# Patient Record
Sex: FEMALE | Race: WHITE
[De-identification: names, ages, dates, MRNs, and addresses within clinical notes are randomized per-mention and may not be internally consistent; named-entity substitution may affect disease eponyms.]

---

## 2019-03-05 ENCOUNTER — HOSPITAL ENCOUNTER (OUTPATIENT)
Dept: HOSPITAL 91 - OBT | Age: 24
LOS: 1 days | Discharge: HOME | End: 2019-03-06
Payer: MEDICAID

## 2019-03-05 ENCOUNTER — HOSPITAL ENCOUNTER (OUTPATIENT)
Dept: HOSPITAL 10 - OBT | Age: 24
LOS: 1 days | Discharge: HOME | End: 2019-03-06
Attending: OBSTETRICS & GYNECOLOGY
Payer: MEDICAID

## 2019-03-05 VITALS — WEIGHT: 194.45 LBS | BODY MASS INDEX: 32.4 KG/M2 | HEIGHT: 65 IN

## 2019-03-05 VITALS — RESPIRATION RATE: 18 BRPM | DIASTOLIC BLOOD PRESSURE: 70 MMHG | HEART RATE: 107 BPM | SYSTOLIC BLOOD PRESSURE: 119 MMHG

## 2019-03-05 DIAGNOSIS — Z3A.34: ICD-10-CM

## 2019-03-05 PROCEDURE — 76818 FETAL BIOPHYS PROFILE W/NST: CPT

## 2019-03-05 PROCEDURE — 85025 COMPLETE CBC W/AUTO DIFF WBC: CPT

## 2019-03-05 PROCEDURE — 96361 HYDRATE IV INFUSION ADD-ON: CPT

## 2019-03-05 PROCEDURE — G0463 HOSPITAL OUTPT CLINIC VISIT: HCPCS

## 2019-03-05 PROCEDURE — 81001 URINALYSIS AUTO W/SCOPE: CPT

## 2019-03-05 PROCEDURE — 96360 HYDRATION IV INFUSION INIT: CPT

## 2019-03-05 PROCEDURE — 36415 COLL VENOUS BLD VENIPUNCTURE: CPT

## 2019-03-05 PROCEDURE — 76817 TRANSVAGINAL US OBSTETRIC: CPT

## 2019-03-05 PROCEDURE — 76815 OB US LIMITED FETUS(S): CPT

## 2019-03-06 LAB
ADD MAN DIFF?: NO
ADD UMIC: YES
BASOPHIL #: 0.1 10^3/UL (ref 0–0.1)
BASOPHILS %: 0.5 % (ref 0–2)
EOSINOPHILS #: 0.3 10^3/UL (ref 0–0.5)
EOSINOPHILS %: 2.7 % (ref 0–7)
HEMATOCRIT: 32.4 % (ref 37–47)
HEMOGLOBIN: 10.5 G/DL (ref 12–16)
IMMATURE GRANS #M: 0.08 10^3/UL (ref 0–0.03)
IMMATURE GRANS % (M): 0.8 % (ref 0–0.43)
LYMPHOCYTES #: 2.5 10^3/UL (ref 0.8–2.9)
LYMPHOCYTES %: 26.1 % (ref 15–51)
MEAN CORPUSCULAR HEMOGLOBIN: 29 PG (ref 29–33)
MEAN CORPUSCULAR HGB CONC: 32.4 G/DL (ref 32–37)
MEAN CORPUSCULAR VOLUME: 89.5 FL (ref 82–101)
MEAN PLATELET VOLUME: 9.5 FL (ref 7.4–10.4)
MONOCYTE #: 1 10^3/UL (ref 0.3–0.9)
MONOCYTES %: 10.5 % (ref 0–11)
NEUTROPHIL #: 5.8 10^3/UL (ref 1.6–7.5)
NEUTROPHILS %: 59.4 % (ref 39–77)
NUCLEATED RED BLOOD CELLS #: 0 10^3/UL (ref 0–0)
NUCLEATED RED BLOOD CELLS%: 0 /100WBC (ref 0–0)
PLATELET COUNT: 233 10^3/UL (ref 140–415)
RED BLOOD COUNT: 3.62 10^6/UL (ref 4.2–5.4)
RED CELL DISTRIBUTION WIDTH: 13 % (ref 11.5–14.5)
UR ASCORBIC ACID: NEGATIVE MG/DL
UR BILIRUBIN (DIP): NEGATIVE MG/DL
UR BLOOD (DIP): NEGATIVE MG/DL
UR CLARITY: (no result)
UR COLOR: YELLOW
UR GLUCOSE (DIP): (no result) MG/DL
UR KETONES (DIP): NEGATIVE MG/DL
UR LEUKOCYTE ESTERASE (DIP): (no result) LEU/UL
UR NITRITE (DIP): NEGATIVE MG/DL
UR PH (DIP): 7 (ref 5–9)
UR RBC: 1 /HPF (ref 0–5)
UR SPECIFIC GRAVITY (DIP): 1.02 (ref 1–1.03)
UR SQUAMOUS EPITHELIAL CELL: (no result) /HPF
UR TOTAL PROTEIN (DIP): NEGATIVE MG/DL
UR UROBILINOGEN (DIP): NEGATIVE MG/DL
UR WBC: 23 /HPF (ref 0–5)
WHITE BLOOD COUNT: 9.7 10^3/UL (ref 4.8–10.8)

## 2019-03-06 RX ADMIN — PYRIDOXINE HYDROCHLORIDE 1 MLS/HR: 100 INJECTION, SOLUTION INTRAMUSCULAR; INTRAVENOUS at 01:11

## 2019-03-06 NOTE — PN
Triage Information


Date/Time





Reason for visit:  Uterine contractions


Weeks of Gestation


34+ weeks


/Para





Diabetes:  none


Hypertention:  none





Objective





Vital Signs


  Date      Temp  Pulse  Resp  B/P (MAP)   Pulse Ox  O2          O2 Flow    FiO2


Time                                                 Delivery    Rate


    3/5/19  98.2    107    18      119/70            Room Air


     23:26                           (86)





Fetal Heart Rate:  120's





Results/Medications


Result Diagram:  


3/6/19 0111





Results 24 hrs





Laboratory Tests


     Test
                                      3/5/19
22:45  3/6/19
01:11


     Urine Color                          YELLOW


     Urine Clarity
                       SLIGHTLY
CLOUDY  A  



     Urine pH                                          7.0


     Urine Specific Gravity                          1.017


     Urine Ketones                        NEGATIVE


     Urine Nitrite                        NEGATIVE


     Urine Bilirubin                      NEGATIVE


     Urine Urobilinogen                   NEGATIVE


     Urine Leukocyte Esterase                          1+  H


     Urine Microscopic RBC                               1


     Urine Microscopic WBC                             23  H


     Urine Squamous Epithelial
Cells      MODERATE  
         



     Urine Hemoglobin                     NEGATIVE


     Urine Glucose                                     1+  H


     Urine Total Protein                  NEGATIVE


     White Blood Count                                               9.7


     Red Blood Count                                               3.62  L


     Hemoglobin                                                    10.5  L


     Hematocrit                                                    32.4  L


     Mean Corpuscular Volume                                        89.5


     Mean Corpuscular Hemoglobin                                    29.0


     Mean Corpuscular Hemoglobin
Concent  
                        32.4  



     Red Cell Distribution Width                                    13.0


     Platelet Count                                                  233


     Mean Platelet Volume                                            9.5


     Immature Granulocytes %                                      0.800  H


     Neutrophils %                                                  59.4


     Lymphocytes %                                                  26.1


     Monocytes %                                                    10.5


     Eosinophils %                                                   2.7


     Basophils %                                                     0.5


     Nucleated Red Blood Cells %                                     0.0


     Immature Granulocytes #                                      0.080  H


     Neutrophils #                                                   5.8


     Lymphocytes #                                                   2.5


     Monocytes #                                                    1.0  H


     Eosinophils #                                                   0.3


     Basophils #                                                     0.1


     Nucleated Red Blood Cells #                                     0.0





Medications





Current Medications


Lactated Ringer's 1,000 ml @  125 mls/hr Q8H IV ;  Start 3/6/19 at 00:00


Imaging Results


Cervical length normal


Disposition:  Discharge


Assessment/Plan


After rest and hydration, patient states her contractions spaced out.











TOAN SALAS MD             Mar 6, 2019 04:34

## 2019-03-06 NOTE — TRIAGE
===================================

OB Triage

===================================

Datetime Report Generated by CPN: 2019 05:26

   

   

===========================

Datetime: 2019 04:32

===========================

   

 Stage of Pregnancy:  OB Triage

   

===================================

Labor Evaluation

===================================

   

 Frequency:  130

 Monitor Mode:  External

 Quality:  Mild

 Pattern:  Normal: <= 5 Contractions in 10 Minutes

 Resting Tone [place]:  Relaxed

   

===================================

Fetal Heart Rate

===================================

   

 FHR Baseline Rate:  130

 Monitor Mode:  External US

 FHR Baseline Changes:  No Baseline Change

 Variability:  Moderate 6-25 bpm

 Accelerations:  15X15

 Decelerations:  None

 Category:  Category I

   

===========================

Datetime: 2019 03:38

===========================

   

 Stage of Pregnancy:  OB Triage

   

===================================

Labor Evaluation

===================================

   

 Frequency:  5-15

 Monitor Mode:  External

 Quality:  Mild

 Pattern:  Normal: <= 5 Contractions in 10 Minutes

 Resting Tone [place]:  Relaxed

 Contraction Comments:  Pt states she feels ucs q30 min

   

===================================

Fetal Heart Rate

===================================

   

 FHR Baseline Rate:  125

 Monitor Mode:  External US

 FHR Baseline Changes:  No Baseline Change

 Variability:  Moderate 6-25 bpm

 Accelerations:  15X15

 Decelerations:  None

 Category:  Category I

   

===========================

Datetime: 2019 02:50

===========================

   

 Stage of Pregnancy:  OB Triage

   

===================================

Labor Evaluation

===================================

   

 Frequency:  2-6

 Monitor Mode:  External

 Duration (sec)2399:  20-40

 Quality:  Mild

 Pattern:  Normal: <= 5 Contractions in 10 Minutes

 Resting Tone [place]:  Relaxed

   

===================================

Fetal Heart Rate

===================================

   

 FHR Baseline Rate:  130

 Monitor Mode:  External US

 Variability:  Moderate 6-25 bpm

 Accelerations:  15X15

 Decelerations:  None

 Category:  Category I

   

===========================

Datetime: 2019 02:13

===========================

   

   

===================================

Vaginal Exam

===================================

   

 Membrane Status:  Intact

   

===========================

Datetime: 2019 02:02

===========================

   

 Stage of Pregnancy:  OB Triage

   

===================================

Labor Evaluation

===================================

   

 Frequency:  2-15

 Monitor Mode:  External

 Duration (sec)2399:  10-50

 Quality:  Mild

 Pattern:  Normal: <= 5 Contractions in 10 Minutes

 Resting Tone [place]:  Relaxed

 Contraction Comments:  Pt states ucs very mild and occas

   

===================================

Fetal Heart Rate

===================================

   

 FHR Baseline Rate:  140

 Monitor Mode:  External US

 FHR Baseline Changes:  No Baseline Change

 Variability:  Moderate 6-25 bpm

 Accelerations:  15X15

 Decelerations:  None

 Category:  Category I

   

===================================

Pain Assessment

===================================

   

 Pain Scale:  2

 Pain Presence:  Intermittent

 Pain Type:  Cramping

 Pain Location:  Abdomen

   

===========================

Datetime: 2019 01:11

===========================

   

 Stage of Pregnancy:  OB Triage

   

===================================

Labor Evaluation

===================================

   

 Frequency:  3-6

 Monitor Mode:  External

 Quality:  Mild

 Pattern:  Normal: <= 5 Contractions in 10 Minutes

 Resting Tone [place]:  Relaxed

   

===================================

Fetal Heart Rate

===================================

   

 FHR Baseline Rate:  135

 Monitor Mode:  External US

 FHR Baseline Changes:  No Baseline Change

 Variability:  Moderate 6-25 bpm

 Accelerations:  15X15

 Decelerations:  None

 Category:  Category I

   

===========================

Datetime: 2019 01:07

===========================

   

 Stage of Pregnancy:  OB Triage

   

===================================

Pain Assessment

===================================

   

 Pain Scale:  4

 Pain Presence:  Intermittent

 Pain Type:  Cramping

 Pain Location:  Abdomen

   

===========================

Datetime: 2019 00:30

===========================

   

 Stage of Pregnancy:  OB Triage

   

===================================

Fetal Heart Rate

===================================

   

 FHR Baseline Rate:  140

 Monitor Mode:  External US

 FHR Baseline Changes:  No Baseline Change

 Variability:  Moderate 6-25 bpm

 Accelerations:  15X15

 Decelerations:  None

 Category:  Category I

   

===========================

Datetime: 2019 23:58

===========================

   

 Stage of Pregnancy:  OB Triage

 Monitor Mode:  External

 Quality:  Mild

 Pattern:  Normal: <= 5 Contractions in 10 Minutes

 Resting Tone [place]:  Relaxed

   

===================================

Fetal Heart Rate

===================================

   

 FHR Baseline Rate:  130

 Monitor Mode:  External US

 FHR Baseline Changes:  No Baseline Change

 Variability:  Moderate 6-25 bpm

 Accelerations:  15X15

 Decelerations:  None

 Category:  Category I

   

===========================

Datetime: 2019 23:51

===========================

   

 Time of Arrival:  2019 22:45

 EGA:  34.3

 Arrived By:  Ambulatory

 Arrived From:  Home

 Chief Complaint:   hx c/s x2 and ptd on olamide weekly c/o ucs and back pain

 Fetal Movement:  Present

 Contractions:  Irregular

 Time Contractions Began:  2019 09:00

 Contractions:  q5-15

 Rupture of Membranes:  Denies

 Vaginal Bleeding:  None

 Vaginal Discharge:  Denies

 Recent Sexual Intercouse:  Denies

 Abdominal Trauma:  Not Applicable

 Patient Complaints:  Contractions; Back Pain

 Time Provider Notified:  2019 23:30

 Provider Notified:  Dr Srinivasan

 Initial Plan:  EFM,UA,CVL,EFW,BPP

   

===========================

Datetime: 2019 23:30

===========================

   

 Stage of Pregnancy:  OB Triage

   

===================================

Labor Evaluation

===================================

   

 Frequency:  2-5

 Monitor Mode:  External

 Quality:  Mild

 Pattern:  Normal: <= 5 Contractions in 10 Minutes

 Resting Tone [place]:  Relaxed

   

===================================

Fetal Heart Rate

===================================

   

 FHR Baseline Rate:  140

 Monitor Mode:  External US

 FHR Baseline Changes:  No Baseline Change

 Variability:  Moderate 6-25 bpm

 Accelerations:  15X15

 Decelerations:  None

 Category:  Category I

   

===========================

Datetime: 2019 23:00

===========================

   

 Stage of Pregnancy:  OB Triage

   

===================================

Maternal Assessment

===================================

   

 Level of Consciousness:  Fully Conscious

 Headache:  Denies

 Blurred Vision:  No

 Nausea/Vomiting:  Denies

 RUQ Epigastric Pain:  Denies

 Facial Edema:  None

 Monitor Mode:  External

 Resting Tone [place]:  Relaxed

   

===================================

Fetal Heart Rate

===================================

   

 FHR Baseline Rate:  140

 Monitor Mode:  External US

   

===================================

Pain Assessment

===================================

   

 Pain Scale:  8

 Pain Presence:  Intermittent

 Pain Type:  Cramping; Pressure; Ache

 Pain Location:  Abdomen; Back

## 2019-03-07 ENCOUNTER — HOSPITAL ENCOUNTER (OUTPATIENT)
Dept: HOSPITAL 91 - OBT | Age: 24
Discharge: HOME | End: 2019-03-07
Payer: MEDICAID

## 2019-03-07 ENCOUNTER — HOSPITAL ENCOUNTER (OUTPATIENT)
Dept: HOSPITAL 10 - OBT | Age: 24
Discharge: HOME | End: 2019-03-07
Attending: OBSTETRICS & GYNECOLOGY
Payer: MEDICAID

## 2019-03-07 VITALS
HEIGHT: 67 IN | BODY MASS INDEX: 30 KG/M2 | WEIGHT: 191.14 LBS | BODY MASS INDEX: 30 KG/M2 | WEIGHT: 191.14 LBS | HEIGHT: 67 IN

## 2019-03-07 VITALS — SYSTOLIC BLOOD PRESSURE: 125 MMHG | DIASTOLIC BLOOD PRESSURE: 71 MMHG | RESPIRATION RATE: 18 BRPM | HEART RATE: 109 BPM

## 2019-03-07 DIAGNOSIS — Z3A.34: ICD-10-CM

## 2019-03-07 LAB
ADD UMIC: YES
UR ASCORBIC ACID: NEGATIVE MG/DL
UR BACTERIA: (no result) /HPF
UR BILIRUBIN (DIP): NEGATIVE MG/DL
UR BLOOD (DIP): NEGATIVE MG/DL
UR CLARITY: (no result)
UR COLOR: (no result)
UR GLUCOSE (DIP): NEGATIVE MG/DL
UR KETONES (DIP): NEGATIVE MG/DL
UR LEUKOCYTE ESTERASE (DIP): (no result) LEU/UL
UR NITRITE (DIP): NEGATIVE MG/DL
UR PH (DIP): 6 (ref 5–9)
UR RBC: 4 /HPF (ref 0–5)
UR SPECIFIC GRAVITY (DIP): 1 (ref 1–1.03)
UR SQUAMOUS EPITHELIAL CELL: (no result) /HPF
UR TOTAL PROTEIN (DIP): NEGATIVE MG/DL
UR UROBILINOGEN (DIP): NEGATIVE MG/DL
UR WBC: 4 /HPF (ref 0–5)

## 2019-03-07 PROCEDURE — 36415 COLL VENOUS BLD VENIPUNCTURE: CPT

## 2019-03-07 PROCEDURE — 87086 URINE CULTURE/COLONY COUNT: CPT

## 2019-03-07 PROCEDURE — 96360 HYDRATION IV INFUSION INIT: CPT

## 2019-03-07 PROCEDURE — 96361 HYDRATE IV INFUSION ADD-ON: CPT

## 2019-03-07 PROCEDURE — 76817 TRANSVAGINAL US OBSTETRIC: CPT

## 2019-03-07 PROCEDURE — 96372 THER/PROPH/DIAG INJ SC/IM: CPT

## 2019-03-07 PROCEDURE — 76818 FETAL BIOPHYS PROFILE W/NST: CPT

## 2019-03-07 PROCEDURE — 81001 URINALYSIS AUTO W/SCOPE: CPT

## 2019-03-07 PROCEDURE — G0463 HOSPITAL OUTPT CLINIC VISIT: HCPCS

## 2019-03-07 RX ADMIN — PYRIDOXINE HYDROCHLORIDE 1 MLS/HR: 100 INJECTION, SOLUTION INTRAMUSCULAR; INTRAVENOUS at 11:43

## 2019-03-07 RX ADMIN — TERBUTALINE SULFATE 1 MG: 1 INJECTION SUBCUTANEOUS at 11:43

## 2019-03-07 NOTE — TRIAGE
===================================

OB Triage

===================================

Datetime Report Generated by CPN: 03/07/2019 13:42

   

   

===========================

Datetime: 03/07/2019 12:58

===========================

   

 Stage of Pregnancy:  OB Triage

   

===========================

Datetime: 03/07/2019 12:40

===========================

   

 Frequency:  0

 Monitor Mode:  External

 Pattern:  Normal: <= 5 Contractions in 10 Minutes

 Resting Tone [place]:  Relaxed

 FHR Baseline Rate:  135

 Monitor Mode:  External US

 Variability:  Moderate 6-25 bpm

 Accelerations:  10X10

 Decelerations:  None

 Category:  Category I

 Pain Scale:  0

 Pain Presence:  None/Denies

 Pain Type:  N/A

 Pain Goal:  3

 Pain Relief Measures:  Comfort Measures

   

===========================

Datetime: 03/07/2019 11:43

===========================

   

 Frequency:  4-6

 Monitor Mode:  External

 Duration (sec)2399:  30-40

 Quality:  Mild

 Pattern:  Normal: <= 5 Contractions in 10 Minutes

 Resting Tone [place]:  Relaxed

 FHR Baseline Rate:  135

 Monitor Mode:  External US

 Variability:  Moderate 6-25 bpm

 Accelerations:  10X10

 Decelerations:  None

 Category:  Category I

 Pain Scale:  7

 Pain Presence:  Intermittent

 Pain Type:  Cramping

 Pain Location:  Abdomen; Back; Perineum

 Pain Goal:  3

 Pain Relief Measures:  Comfort Measures

   

===========================

Datetime: 03/07/2019 11:00

===========================

   

 Stage of Pregnancy:  OB Triage

   

===========================

Datetime: 03/07/2019 10:45

===========================

   

 Stage of Pregnancy:  OB Triage

 Assessment Type:  Triage

 Level of Consciousness:  Fully Conscious

 DTR's/Clonus:  DTRs 2+; No Clonus

 Headache:  Denies

 Blurred Vision:  No

 Respiratory Effort:  Unlabored; Regular Rhythm; Equal Expansion

 Breath Sounds, Left:  Clear and Equal

 Breath Sounds, Right:  Clear and Equal

 Nausea/Vomiting:  Denies

 RUQ Epigastric Pain:  Denies

 Facial Edema:  None

 Temperature Route:  Axillary

 History of Falling:  (0) No

 Secondary Diagnosis:  (0) No

 Ambulatory Aid:  (0) Bedrest/Nurse Assist

 IV Therapy:  (0) No

 Gait:  (0) Normal/Bedrest/Immobile

 Mental Status:  (0) Oriented to Own Ability

 Fall Score:  0

 Fall Risk Score Definition:  No Risk: No action required

 Frequency:  5-6

 Monitor Mode:  External

 Duration (sec)2399:  30-50

 Quality:  Mild

 Pattern:  Normal: <= 5 Contractions in 10 Minutes

 Resting Tone [place]:  Relaxed

 FHR Baseline Rate:  135

 Monitor Mode:  External US

 Variability:  Moderate 6-25 bpm

 Accelerations:  None

 Decelerations:  None

 Category:  Category I

 Pain Scale:  8

 Pain Presence:  Intermittent

 Pain Type:  Cramping

 Pain Location:  Abdomen; Back

 Pain Goal:  3

 Pain Relief Measures:  Comfort Measures

   

===========================

Datetime: 03/07/2019 10:44

===========================

   

 Time of Arrival:  03/07/2019 10:10

 EGA:  34.5

 Arrived By:  Ambulatory

 Arrived From:  Dr. Office

 Chief Complaint:  REFERRED FROM CLINIC THIS AM TO R/O PTL, 

   DENIES BLEEDING OR LEAKING OF FLUID, SOME INCONSISTANT ABDOMINAL PAIN

 Fetal Movement:  Present

 Contractions:  Occasional

 Rupture of Membranes:  Denies

 Vaginal Bleeding:  None

 Vaginal Discharge:  Denies

 Recent Sexual Intercouse:  Denies

 Abdominal Trauma:  Not Applicable

 Patient Complaints:  Cramping

 Time Provider Notified:  03/07/2019 11:00

 Provider Notified:  SHOAIB

 Initial Plan:  MONITOR, U/A, BPP, CL, IV HYDRATION, TERB

## 2019-03-07 NOTE — PN
Triage Information


Date/Time


2019


Reason for visit:  


Weeks of Gestation


34 weeks and 5 days


/Para


 3 para 2


Diabetes:  none


Hypertention:  none


Additional information


23-year-old  with IUP at 34 weeks and 5 days times presented with complaint


of uterine contractions.  She denied any leaking of fluid, vaginal bleeding or 


decreased fetal movement.  She was seen 2 days ago with the similar symptoms and


had been ruled out for  labor.


Patient received IV hydration and a dose of terbutaline ordered by primary OB 


attending.  Symptoms resolved.





Objective





Vital Signs


  Date      Temp  Pulse  Resp  B/P (MAP)   Pulse Ox  O2          O2 Flow    FiO2


Time                                                 Delivery    Rate


    3/7/19  98.2    109    18      125/71


     10:59                           (89)





Fetal Heart Rate:  130's


Fetal Heart Rate Comments


Category 1


Exam


General appearance: Alert and oriented x4 does not appear to be in any acute 


distress


Abdomen: Soft, gravid, fundal height consider gestational age


NST: Category 1


Initial location contraction noted on the monitor and resolved after hydration 


and a dose of terbutaline


UA negative BPP: 8/8





Cervical length: 4.1


EMMANUEL: 18.2


Laboratory Tests


              Test
                                  3/7/19
10:45


              Urine Color                      STRAW


              Urine Clarity
                   SLIGHTLY
CLOUDY  A


              Urine pH                                      6.0


              Urine Specific Gravity                      1.005


              Urine Ketones                    NEGATIVE


              Urine Nitrite                    NEGATIVE


              Urine Bilirubin                  NEGATIVE


              Urine Urobilinogen               NEGATIVE


              Urine Leukocyte Esterase         TRACE  A


              Urine Microscopic RBC                           4


              Urine Microscopic WBC                           4


              Urine Squamous Epithelial
Cells  FEW  



              Urine Bacteria                   FEW  A


              Urine Hemoglobin                 NEGATIVE


              Urine Glucose                    NEGATIVE


              Urine Total Protein              NEGATIVE








Results/Medications


Results 24 hrs





Laboratory Tests


              Test
                                  3/7/19
10:45


              Urine Color                      STRAW


              Urine Clarity
                   SLIGHTLY
CLOUDY  A


              Urine pH                                      6.0


              Urine Specific Gravity                      1.005


              Urine Ketones                    NEGATIVE


              Urine Nitrite                    NEGATIVE


              Urine Bilirubin                  NEGATIVE


              Urine Urobilinogen               NEGATIVE


              Urine Leukocyte Esterase         TRACE  A


              Urine Microscopic RBC                           4


              Urine Microscopic WBC                           4


              Urine Squamous Epithelial
Cells  FEW  



              Urine Bacteria                   FEW  A


              Urine Hemoglobin                 NEGATIVE


              Urine Glucose                    NEGATIVE


              Urine Total Protein              NEGATIVE





Imaging Results


PROCEDURE:   US OB biophysical profile. Ultrasound cervix


 


CLINICAL INDICATION:    decreased fetal movements,  labor 


 


TECHNIQUE:   Multiple sonographic images of the pelvis were obtained.  In 


addition, transvaginal images of the cervix were obtained. The images were 


reviewed on a PACS workstation. 


 


COMPARISON:   No prior studies are available for comparison. 


 


FINDINGS:


 


The cervix measures 4.1 cm in length.


There is a single live intrauterine gestation.  Cardiac activity is present with


140 beats per minute. 


There is a vertex presentation. 


The placenta is fundal.   There is no evidence of placental abruption.


There is a normal amount of amniotic fluid with an EMMANUEL = 18.2 cm.


 


Biophysical profile:


Fetal movement 2/2


Fetal tone 2/2.


Fetal breathing 2/2 


EMMANUEL 2/2


 


Total  


 


RPTAT: AA . 


 


IMPRESSION:


Normal biophysical profile.  


Cervix measures 4.1 cm in length.


Disposition:  Discharge


Assessment/Plan


IUP at 34 weeks and 5 days


History of  x2


 contraction, resolved after IV hydration and a dose of terbutaline 


ordered by primary OB attending


Symptoms resolved


No evidence of  labor


Patient doing well.


Denies any symptoms.





Patient will be discharged home in stable condition


Strict  labor precaution and fetal kick count and follow-up within 24 


hours with primary OB attending discussed with patient


Advised the patient to have rest at home with adequate p.o. hydration avoid of 


sexual intercourse and return to triage if she has any other episodes of lower 


abdominal pain or contractions, decreased fetal movements vaginal bleeding or 


any other concerns.


Patient verbalized understanding.  Malay interpretation used.


All questions were answered to patient's best satisfaction.











TERRI PAIGE MD               Mar 7, 2019 15:18

## 2019-03-10 ENCOUNTER — HOSPITAL ENCOUNTER (INPATIENT)
Dept: HOSPITAL 10 - OBT | Age: 24
LOS: 3 days | Discharge: HOME | DRG: 833 | End: 2019-03-13
Attending: OBSTETRICS & GYNECOLOGY | Admitting: OBSTETRICS & GYNECOLOGY
Payer: MEDICAID

## 2019-03-10 ENCOUNTER — HOSPITAL ENCOUNTER (INPATIENT)
Dept: HOSPITAL 91 - OBT | Age: 24
LOS: 3 days | Discharge: HOME | DRG: 833 | End: 2019-03-13
Payer: MEDICAID

## 2019-03-10 VITALS
WEIGHT: 188.72 LBS | BODY MASS INDEX: 31.44 KG/M2 | WEIGHT: 188.72 LBS | HEIGHT: 65 IN | HEIGHT: 65 IN | BODY MASS INDEX: 31.44 KG/M2

## 2019-03-10 DIAGNOSIS — Z3A.35: ICD-10-CM

## 2019-03-10 DIAGNOSIS — O34.219: ICD-10-CM

## 2019-03-10 DIAGNOSIS — O36.8130: Primary | ICD-10-CM

## 2019-03-10 LAB
ADD MAN DIFF?: NO
ADD UMIC: NO
BASOPHIL #: 0 10^3/UL (ref 0–0.1)
BASOPHILS %: 0.3 % (ref 0–2)
EOSINOPHILS #: 0.2 10^3/UL (ref 0–0.5)
EOSINOPHILS %: 2.6 % (ref 0–7)
HEMATOCRIT: 33 % (ref 37–47)
HEMOGLOBIN: 10.6 G/DL (ref 12–16)
IMMATURE GRANS #M: 0.08 10^3/UL (ref 0–0.03)
IMMATURE GRANS % (M): 1 % (ref 0–0.43)
LYMPHOCYTES #: 1.8 10^3/UL (ref 0.8–2.9)
LYMPHOCYTES %: 22.5 % (ref 15–51)
MEAN CORPUSCULAR HEMOGLOBIN: 28.6 PG (ref 29–33)
MEAN CORPUSCULAR HGB CONC: 32.1 G/DL (ref 32–37)
MEAN CORPUSCULAR VOLUME: 88.9 FL (ref 82–101)
MEAN PLATELET VOLUME: 9.1 FL (ref 7.4–10.4)
MONOCYTE #: 0.6 10^3/UL (ref 0.3–0.9)
MONOCYTES %: 7.2 % (ref 0–11)
NEUTROPHIL #: 5.3 10^3/UL (ref 1.6–7.5)
NEUTROPHILS %: 66.4 % (ref 39–77)
NUCLEATED RED BLOOD CELLS #: 0 10^3/UL (ref 0–0)
NUCLEATED RED BLOOD CELLS%: 0 /100WBC (ref 0–0)
PLATELET COUNT: 225 10^3/UL (ref 140–415)
RAPID PLASMA REAGIN: NONREACTIVE
RED BLOOD COUNT: 3.71 10^6/UL (ref 4.2–5.4)
RED CELL DISTRIBUTION WIDTH: 13 % (ref 11.5–14.5)
UR ASCORBIC ACID: NEGATIVE MG/DL
UR BILIRUBIN (DIP): NEGATIVE MG/DL
UR BLOOD (DIP): NEGATIVE MG/DL
UR CLARITY: (no result)
UR COLOR: YELLOW
UR GLUCOSE (DIP): NEGATIVE MG/DL
UR KETONES (DIP): NEGATIVE MG/DL
UR LEUKOCYTE ESTERASE (DIP): NEGATIVE LEU/UL
UR NITRITE (DIP): NEGATIVE MG/DL
UR PH (DIP): 6 (ref 5–9)
UR RBC: 0 /HPF (ref 0–5)
UR SPECIFIC GRAVITY (DIP): 1.02 (ref 1–1.03)
UR SQUAMOUS EPITHELIAL CELL: (no result) /HPF
UR TOTAL PROTEIN (DIP): NEGATIVE MG/DL
UR UROBILINOGEN (DIP): NEGATIVE MG/DL
UR WBC: 1 /HPF (ref 0–5)
WHITE BLOOD COUNT: 8 10^3/UL (ref 4.8–10.8)

## 2019-03-10 PROCEDURE — 86900 BLOOD TYPING SEROLOGIC ABO: CPT

## 2019-03-10 PROCEDURE — 85025 COMPLETE CBC W/AUTO DIFF WBC: CPT

## 2019-03-10 PROCEDURE — 87086 URINE CULTURE/COLONY COUNT: CPT

## 2019-03-10 PROCEDURE — 4A1HXCZ MONITORING OF PRODUCTS OF CONCEPTION, CARDIAC RATE, EXTERNAL APPROACH: ICD-10-PCS | Performed by: OBSTETRICS & GYNECOLOGY

## 2019-03-10 PROCEDURE — 81001 URINALYSIS AUTO W/SCOPE: CPT

## 2019-03-10 PROCEDURE — 81003 URINALYSIS AUTO W/O SCOPE: CPT

## 2019-03-10 PROCEDURE — 76818 FETAL BIOPHYS PROFILE W/NST: CPT

## 2019-03-10 PROCEDURE — 86592 SYPHILIS TEST NON-TREP QUAL: CPT

## 2019-03-10 PROCEDURE — 86901 BLOOD TYPING SEROLOGIC RH(D): CPT

## 2019-03-10 PROCEDURE — 4A1HXCZ MONITORING OF PRODUCTS OF CONCEPTION, CARDIAC RATE, EXTERNAL APPROACH: ICD-10-PCS

## 2019-03-10 PROCEDURE — 80307 DRUG TEST PRSMV CHEM ANLYZR: CPT

## 2019-03-10 PROCEDURE — G0463 HOSPITAL OUTPT CLINIC VISIT: HCPCS

## 2019-03-10 RX ADMIN — PYRIDOXINE HYDROCHLORIDE 1 MLS/HR: 100 INJECTION, SOLUTION INTRAMUSCULAR; INTRAVENOUS at 13:16

## 2019-03-10 RX ADMIN — PYRIDOXINE HYDROCHLORIDE SCH MLS/HR: 100 INJECTION, SOLUTION INTRAMUSCULAR; INTRAVENOUS at 20:08

## 2019-03-10 RX ADMIN — DEXAMETHASONE SODIUM PHOSPHATE SCH MG: 4 INJECTION, SOLUTION INTRAMUSCULAR; INTRAVENOUS at 20:28

## 2019-03-10 RX ADMIN — PYRIDOXINE HYDROCHLORIDE SCH MLS/HR: 100 INJECTION, SOLUTION INTRAMUSCULAR; INTRAVENOUS at 13:16

## 2019-03-10 RX ADMIN — PYRIDOXINE HYDROCHLORIDE 1 MLS/HR: 100 INJECTION, SOLUTION INTRAMUSCULAR; INTRAVENOUS at 20:08

## 2019-03-10 RX ADMIN — DEXAMETHASONE SODIUM PHOSPHATE 1 MG: 4 INJECTION, SOLUTION INTRAMUSCULAR; INTRAVENOUS at 20:28

## 2019-03-10 RX ADMIN — TERBUTALINE SULFATE 1 MG: 1 INJECTION SUBCUTANEOUS at 19:39

## 2019-03-10 NOTE — TRIAGE
===================================

OB Triage

===================================

Datetime Report Generated by CPN: 03/10/2019 11:18

   

   

===========================

Datetime: 03/10/2019 11:18

===========================

   

 Assessment Type:  Admission Assessment

   

===================================

Maternal Assessment

===================================

   

 Level of Consciousness:  Fully Conscious

 DTR's/Clonus:  DTRs 2+; No Clonus

 Headache:  Denies

 Blurred Vision:  No

 Respiratory Effort:  Unlabored; Regular Rhythm; Equal Expansion

 Breath Sounds, Left:  Clear and Equal

 Breath Sounds, Right:  Clear and Equal

 Nausea/Vomiting:  Denies

 RUQ Epigastric Pain:  Denies

 Facial Edema:  None

   

===================================

Fall Risk Assessment

===================================

   

 History of Falling:  (0) No

 Secondary Diagnosis:  (0) No

 Ambulatory Aid:  (0) Bedrest/Nurse Assist

 IV Therapy:  (0) No

 Gait:  (0) Normal/Bedrest/Immobile

 Mental Status:  (0) Oriented to Own Ability

 Fall Score:  0

 Fall Risk Score Definition:  No Risk: No action required

   

===========================

Datetime: 03/10/2019 11:00

===========================

   

 Stage of Pregnancy:  OB Triage

   

===================================

Maternal Assessment

===================================

   

 Level of Consciousness:  Fully Conscious

 DTR's/Clonus:  DTRs 1+

 Headache:  Denies

 Breath Sounds, Left:  Clear and Equal

 Breath Sounds, Right:  Clear and Equal

 Nausea/Vomiting:  Denies

 RUQ Epigastric Pain:  Denies

   

===================================

Labor Evaluation

===================================

   

 Frequency:  NONE

 Monitor Mode:  External

 Resting Tone Rudd:  Relaxed

   

===================================

Fetal Heart Rate

===================================

   

 FHR Baseline Rate:  145

 Monitor Mode:  External US

 Variability:  Moderate 6-25 bpm

 Accelerations:  15X15

 Decelerations:  Variable

 Category:  Category I

   

===================================

Pain Assessment

===================================

   

 Pain Scale:  3

 Pain Presence:  Constant

 Pain Type:  Ache

 Pain Location:  Abdomen

 Pain Goal:  3

   

===================================

Vaginal Exam

===================================

   

 Membrane Status:  Intact

   

===========================

Datetime: 03/10/2019 10:46

===========================

   

 Comments:  vARIABLE NOTED BASELINE 145 BPM, DOWN TO ABOUT 100 FOR ABOUT 60 SEC AND GOING BACK TO BAS
MAGGY. PT REPOSITIONED

   

===========================

Datetime: 03/10/2019 10:30

===========================

   

   

===================================

Maternal Assessment

===================================

   

 Level of Consciousness:  Fully Conscious

 DTR's/Clonus:  DTRs 1+

 Headache:  Denies

 Blurred Vision:  No

 Respiratory Effort:  Unlabored

 Breath Sounds, Left:  Clear and Equal

 Breath Sounds, Right:  Clear and Equal

 Nausea/Vomiting:  Denies

 RUQ Epigastric Pain:  Denies

 Facial Edema:  None

   

===================================

Labor Evaluation

===================================

   

 Frequency:  NONE

 Monitor Mode:  External

 Resting Tone Rudd:  Relaxed

   

===================================

Fetal Heart Rate

===================================

   

 FHR Baseline Rate:  145

 Monitor Mode:  External US

 Variability:  Moderate 6-25 bpm

 Accelerations:  15X15

 Decelerations:  Variable

 Category:  Category I

   

===================================

Pain Assessment

===================================

   

 Pain Scale:  3

 Pain Presence:  Constant

 Pain Type:  Ache

 Pain Location:  Abdomen

 Pain Goal:  3

 Pain Assessment Comments:  INCISIONAL PAIN

   

===================================

Vaginal Exam

===================================

   

 Membrane Status:  Intact

   

===========================

Datetime: 03/10/2019 09:58

===========================

   

 Assessment Type:  Triage

   

===================================

Maternal Assessment

===================================

   

 Level of Consciousness:  Fully Conscious

 DTR's/Clonus:  DTRs 2+; No Clonus

 Headache:  Denies

 Blurred Vision:  No

 Respiratory Effort:  Unlabored; Regular Rhythm; Equal Expansion

 Breath Sounds, Left:  Clear and Equal

 Breath Sounds, Right:  Clear and Equal

 Nausea/Vomiting:  Denies

 RUQ Epigastric Pain:  Denies

 Lower Extremities Edema:  None

     Degree:  None

 Upper Extremities Edema:  None

     Degree:  None

 Facial Edema:  None

   

===================================

Fall Risk Assessment

===================================

   

 History of Falling:  (0) No

 Secondary Diagnosis:  (0) No

 Ambulatory Aid:  (0) Bedrest/Nurse Assist

 IV Therapy:  (0) No

 Gait:  (0) Normal/Bedrest/Immobile

 Mental Status:  (0) Oriented to Own Ability

 Fall Score:  0

 Fall Risk Score Definition:  No Risk: No action required

   

===========================

Datetime: 03/10/2019 09:43

===========================

   

 Time of Arrival:  03/10/2019 11:00

 EGA:  35.1

 Arrived By:  Ambulatory

 Arrived From:  Home

 Chief Complaint:  PT CAME IN C/O DFM SINCE 2 DAYS AGO PT ALSO COMPLAINING ABOUT INCION PAIN

 Fetal Movement:  Decreased

 Contractions:  Denies/Absent

 Rupture of Membranes:  Denies

 Vaginal Discharge:  Denies

 Recent Sexual Intercouse:  Denies

 Abdominal Trauma:  Not Applicable

 Additional Patient Complaints:  NONE

 Time Provider Notified:  03/10/2019 10:00

 Provider Notified:  ESHAGHIAN

 Initial Plan:  NST BPP

   

===========================

Datetime: 03/07/2019 10:45

===========================

   

 Fall Score:  0

 Fall Risk Score Definition:  No Risk: No action required

   

===========================

Datetime: 03/07/2019 10:44

===========================

   

 EGA:  34.5

   

===========================

Datetime: 03/05/2019 23:51

===========================

   

 EGA:  34.3

## 2019-03-10 NOTE — HP
Date/Time of Note


Date/Time of Note


DATE: 3/10/19 


TIME: 13:36





OB - History


Hx of Present Pregnancy


Free Text/Dictation


35+wks GA with Decreased fetal movements and Non reassuring FHR


Prenatal Care:  Good Care


Ultrasounds:  Normal mid trimester US


Medical Complications:  None





Past Family/Social History


*


Past Medical, Surgical, Family and Obstetric Histories reviewed from prenatal 


chart.





OB  Admission Exam


Physical Exam


Abdomen:  WNL


Extremities:  Normal


Reflexes:  Normal


Membranes:  Intact


Fetal Heart Rate:  140's


Decelerations:  Variable Decelerations


Varibility:  Moderate


Contractions on Admission:  None


Last 72 hours Lab Results


                                    CBC & BMP


3/10/19 13:24











OB  Assessment/Plan


Reason for admission:  observation


Other Assessment:


PMH denies


PSH denies


Allergy NKDA


Plan:  Expectant Management


Other plan:


Prolonged monitoring and observation over night 


IV Hydration


Patint is evaluated on behalf of  and will follow up on the patent











ARGELIA RICCI M.D.            Mar 10, 2019 13:39

## 2019-03-11 RX ADMIN — PYRIDOXINE HYDROCHLORIDE SCH MLS/HR: 100 INJECTION, SOLUTION INTRAMUSCULAR; INTRAVENOUS at 03:40

## 2019-03-11 RX ADMIN — PYRIDOXINE HYDROCHLORIDE 1 MLS/HR: 100 INJECTION, SOLUTION INTRAMUSCULAR; INTRAVENOUS at 19:22

## 2019-03-11 RX ADMIN — DEXAMETHASONE SODIUM PHOSPHATE SCH MG: 4 INJECTION, SOLUTION INTRAMUSCULAR; INTRAVENOUS at 08:33

## 2019-03-11 RX ADMIN — Medication 1 TAB: at 08:59

## 2019-03-11 RX ADMIN — PYRIDOXINE HYDROCHLORIDE 1 MLS/HR: 100 INJECTION, SOLUTION INTRAMUSCULAR; INTRAVENOUS at 11:50

## 2019-03-11 RX ADMIN — Medication SCH TAB: at 08:59

## 2019-03-11 RX ADMIN — PYRIDOXINE HYDROCHLORIDE 1 MLS/HR: 100 INJECTION, SOLUTION INTRAMUSCULAR; INTRAVENOUS at 03:40

## 2019-03-11 RX ADMIN — FERROUS SULFATE TAB 325 MG (65 MG ELEMENTAL FE) SCH MG: 325 (65 FE) TAB at 21:20

## 2019-03-11 RX ADMIN — DEXAMETHASONE SODIUM PHOSPHATE 1 MG: 4 INJECTION, SOLUTION INTRAMUSCULAR; INTRAVENOUS at 08:33

## 2019-03-11 RX ADMIN — FERROUS SULFATE TAB 325 MG (65 MG ELEMENTAL FE) 1 MG: 325 (65 FE) TAB at 08:59

## 2019-03-11 RX ADMIN — FERROUS SULFATE TAB 325 MG (65 MG ELEMENTAL FE) 1 MG: 325 (65 FE) TAB at 21:20

## 2019-03-11 RX ADMIN — PYRIDOXINE HYDROCHLORIDE SCH MLS/HR: 100 INJECTION, SOLUTION INTRAMUSCULAR; INTRAVENOUS at 11:50

## 2019-03-11 RX ADMIN — DEXAMETHASONE SODIUM PHOSPHATE 1 MG: 4 INJECTION, SOLUTION INTRAMUSCULAR; INTRAVENOUS at 20:03

## 2019-03-11 RX ADMIN — DEXAMETHASONE SODIUM PHOSPHATE SCH MG: 4 INJECTION, SOLUTION INTRAMUSCULAR; INTRAVENOUS at 20:03

## 2019-03-11 RX ADMIN — PYRIDOXINE HYDROCHLORIDE SCH MLS/HR: 100 INJECTION, SOLUTION INTRAMUSCULAR; INTRAVENOUS at 19:22

## 2019-03-11 NOTE — QN
Documentation


Comment


patient seen and evaluated


no complaints


positive fetal movement


no contraction


no pain


vs stable afebrile


ab gravid nt


extremity no edema no calf tenderness





fhr cat 1


toco no ctx





a/ iup at 35 wks ga, currently on steroid tx, no sign of  labor


reassuring fetal heart tracing 





p/ continue present management


perinatology consult


repeat urine culture











STEPHANIE CRAMER MD           Mar 11, 2019 09:09

## 2019-03-12 RX ADMIN — PYRIDOXINE HYDROCHLORIDE SCH MLS/HR: 100 INJECTION, SOLUTION INTRAMUSCULAR; INTRAVENOUS at 19:38

## 2019-03-12 RX ADMIN — PYRIDOXINE HYDROCHLORIDE 1 MLS/HR: 100 INJECTION, SOLUTION INTRAMUSCULAR; INTRAVENOUS at 12:05

## 2019-03-12 RX ADMIN — FERROUS SULFATE TAB 325 MG (65 MG ELEMENTAL FE) 1 MG: 325 (65 FE) TAB at 09:31

## 2019-03-12 RX ADMIN — PYRIDOXINE HYDROCHLORIDE SCH MLS/HR: 100 INJECTION, SOLUTION INTRAMUSCULAR; INTRAVENOUS at 12:05

## 2019-03-12 RX ADMIN — PYRIDOXINE HYDROCHLORIDE 1 MLS/HR: 100 INJECTION, SOLUTION INTRAMUSCULAR; INTRAVENOUS at 19:38

## 2019-03-12 RX ADMIN — Medication SCH TAB: at 09:31

## 2019-03-12 RX ADMIN — PYRIDOXINE HYDROCHLORIDE 1 MLS/HR: 100 INJECTION, SOLUTION INTRAMUSCULAR; INTRAVENOUS at 03:08

## 2019-03-12 RX ADMIN — DEXAMETHASONE SODIUM PHOSPHATE SCH MG: 4 INJECTION, SOLUTION INTRAMUSCULAR; INTRAVENOUS at 08:50

## 2019-03-12 RX ADMIN — FERROUS SULFATE TAB 325 MG (65 MG ELEMENTAL FE) SCH MG: 325 (65 FE) TAB at 21:46

## 2019-03-12 RX ADMIN — FERROUS SULFATE TAB 325 MG (65 MG ELEMENTAL FE) SCH MG: 325 (65 FE) TAB at 09:31

## 2019-03-12 RX ADMIN — PYRIDOXINE HYDROCHLORIDE 1 MLS/HR: 100 INJECTION, SOLUTION INTRAMUSCULAR; INTRAVENOUS at 21:47

## 2019-03-12 RX ADMIN — PYRIDOXINE HYDROCHLORIDE SCH MLS/HR: 100 INJECTION, SOLUTION INTRAMUSCULAR; INTRAVENOUS at 03:08

## 2019-03-12 RX ADMIN — DEXAMETHASONE SODIUM PHOSPHATE 1 MG: 4 INJECTION, SOLUTION INTRAMUSCULAR; INTRAVENOUS at 08:50

## 2019-03-12 RX ADMIN — Medication 1 TAB: at 09:31

## 2019-03-12 RX ADMIN — PYRIDOXINE HYDROCHLORIDE SCH MLS/HR: 100 INJECTION, SOLUTION INTRAMUSCULAR; INTRAVENOUS at 21:47

## 2019-03-12 RX ADMIN — FERROUS SULFATE TAB 325 MG (65 MG ELEMENTAL FE) 1 MG: 325 (65 FE) TAB at 21:46

## 2019-03-12 NOTE — QN
Documentation


Comment


patient seen and evaluated


no complaints


positive fetal movement


no contraction


no pain


vs stable afebrile


ab gravid nt


extremity no edema no calf tenderness





fhr cat 1


toco no ctx





a/ iup at 35 wks ga, currently on steroid tx, no sign of  labor


reassuring fetal heart tracing 





p/ continue present management


f/u perinatology 


repeat urine culture











STEPHANIE CRAMER MD           Mar 12, 2019 18:58

## 2019-03-12 NOTE — CONS
DATE OF ADMISSION: 03/10/2019

DATE OF CONSULTATION:  2019

 

 

 

HISTORY OF PRESENT ILLNESS:  The patient is a G3, P2 at 35 weeks and 2 days, presented with complaint
 of incisional pain.  She was given IV hydration and dexamethasone.  She has received 2 doses out of 
4 doses.

 

After reviewing the fetal heart monitor, first of all, there are some irritabilities with very infreq
uent contractions; however, there is some evidence of early deceleration and 1 possible subtle late, 
but overall reassuring.

 

RECOMMENDATIONS:  To manage the patient in-house with continuous fetal heart tone monitoring.  Comple
te the dexamethasone.  Delivery is recommended in  labor if more frequent late deceleration or
 overall nonreassuring fetal heart tone, otherwise, at 39 weeks; however, if the patient is discharge
d, fetal heart tone monitoring twice a week is necessary to assure fetal well-being.

 

 

Dictated By: JESSICA NORTON MD

 

ST/NTS

DD:    2019 23:07:42

DT:    2019 02:55:40

Conf#: [id number]

DID#:  [id number]

CC: STEPHANIE CRAMER MD;*EndCC*

## 2019-03-13 RX ADMIN — Medication 1 TAB: at 10:34

## 2019-03-13 RX ADMIN — PYRIDOXINE HYDROCHLORIDE 1 MLS/HR: 100 INJECTION, SOLUTION INTRAMUSCULAR; INTRAVENOUS at 03:34

## 2019-03-13 RX ADMIN — PYRIDOXINE HYDROCHLORIDE SCH MLS/HR: 100 INJECTION, SOLUTION INTRAMUSCULAR; INTRAVENOUS at 03:34

## 2019-03-13 RX ADMIN — FERROUS SULFATE TAB 325 MG (65 MG ELEMENTAL FE) SCH MG: 325 (65 FE) TAB at 10:34

## 2019-03-13 RX ADMIN — FERROUS SULFATE TAB 325 MG (65 MG ELEMENTAL FE) 1 MG: 325 (65 FE) TAB at 10:34

## 2019-03-13 RX ADMIN — Medication SCH TAB: at 10:34

## 2019-03-13 NOTE — DS
DATE OF ADMISSION: 03/10/2019

DATE OF DISCHARGE: 2019

 

PRIMARY DIAGNOSIS:  Intrauterine pregnancy at 35+ weeks gestational age, decreased fetal movement wit
h category 2 tracing.

 

PROCEDURE:  None.

 

CONDITION ON DISCHARGE:  Stable.

 

DIET:  Regular.

 

MEDICATIONS ON DISCHARGE:  Continue prenatal vitamins.

 

DISCHARGE SUMMARY:  Ms. Renay Montgomery is a 23-year-old female with the history of previous  
who was admitted on 03/10/2019 secondary to decreased fetal movement and non-reassuring fetal heart t
racing.  She was admitted for IV hydration and continued close monitoring.  During close monitoring, 
she had a category 1 tracing with an appropriate biophysical profile.  Her tracing was evaluated by WILMAR Quiñonez who recommends discharge today.  The patient was given strict fetal kick count and will foll
ow up in the office tomorrow.  Orders for nonstress test and biophysical profile were given twice cj hackett.

 

 

Dictated By: STEPHANIE HOWARD/LEEANN

DD:    2019 17:56:58

DT:    2019 19:18:37

Conf#: [id number]

DID#:  [id number]

## 2019-03-29 ENCOUNTER — HOSPITAL ENCOUNTER (INPATIENT)
Dept: HOSPITAL 91 - OBT | Age: 24
LOS: 3 days | Discharge: HOME | End: 2019-04-01
Payer: MEDICAID

## 2019-03-29 ENCOUNTER — HOSPITAL ENCOUNTER (INPATIENT)
Dept: HOSPITAL 10 - L-D | Age: 24
LOS: 3 days | Discharge: HOME | End: 2019-04-01
Attending: OBSTETRICS & GYNECOLOGY | Admitting: OBSTETRICS & GYNECOLOGY
Payer: MEDICAID

## 2019-03-29 VITALS
BODY MASS INDEX: 30.31 KG/M2 | BODY MASS INDEX: 30.31 KG/M2 | WEIGHT: 193.12 LBS | HEIGHT: 67 IN | WEIGHT: 193.12 LBS | HEIGHT: 67 IN

## 2019-03-29 VITALS — SYSTOLIC BLOOD PRESSURE: 122 MMHG | DIASTOLIC BLOOD PRESSURE: 65 MMHG

## 2019-03-29 DIAGNOSIS — O34.211: Primary | ICD-10-CM

## 2019-03-29 DIAGNOSIS — O99.89: ICD-10-CM

## 2019-03-29 DIAGNOSIS — N73.6: ICD-10-CM

## 2019-03-29 DIAGNOSIS — Z3A.37: ICD-10-CM

## 2019-03-29 LAB
ADD MAN DIFF?: NO
ADD UMIC: YES
BASOPHIL #: 0 10^3/UL (ref 0–0.1)
BASOPHILS %: 0.3 % (ref 0–2)
EOSINOPHILS #: 0.1 10^3/UL (ref 0–0.5)
EOSINOPHILS %: 0.7 % (ref 0–7)
HEMATOCRIT: 34.9 % (ref 37–47)
HEMOGLOBIN: 11.3 G/DL (ref 12–16)
IMMATURE GRANS #M: 0.05 10^3/UL (ref 0–0.03)
IMMATURE GRANS % (M): 0.5 % (ref 0–0.43)
INR: 0.93
LYMPHOCYTES #: 2.2 10^3/UL (ref 0.8–2.9)
LYMPHOCYTES %: 21.5 % (ref 15–51)
MEAN CORPUSCULAR HEMOGLOBIN: 28.6 PG (ref 29–33)
MEAN CORPUSCULAR HGB CONC: 32.4 G/DL (ref 32–37)
MEAN CORPUSCULAR VOLUME: 88.4 FL (ref 82–101)
MEAN PLATELET VOLUME: 9.5 FL (ref 7.4–10.4)
MONOCYTE #: 0.7 10^3/UL (ref 0.3–0.9)
MONOCYTES %: 6.9 % (ref 0–11)
NEUTROPHIL #: 7.3 10^3/UL (ref 1.6–7.5)
NEUTROPHILS %: 70.1 % (ref 39–77)
NUCLEATED RED BLOOD CELLS #: 0 10^3/UL (ref 0–0)
NUCLEATED RED BLOOD CELLS%: 0 /100WBC (ref 0–0)
PARTIAL THROMBOPLASTIN TIME: 29.3 SEC (ref 23–35)
PLATELET COUNT: 224 10^3/UL (ref 140–415)
PROTIME: 12.6 SEC (ref 11.9–14.9)
PT RATIO: 1
RAPID PLASMA REAGIN: NONREACTIVE
RED BLOOD COUNT: 3.95 10^6/UL (ref 4.2–5.4)
RED CELL DISTRIBUTION WIDTH: 14.6 % (ref 11.5–14.5)
UR ASCORBIC ACID: NEGATIVE MG/DL
UR BACTERIA: (no result) /HPF
UR BILIRUBIN (DIP): NEGATIVE MG/DL
UR BLOOD (DIP): NEGATIVE MG/DL
UR CLARITY: (no result)
UR COLOR: YELLOW
UR GLUCOSE (DIP): NEGATIVE MG/DL
UR KETONES (DIP): NEGATIVE MG/DL
UR LEUKOCYTE ESTERASE (DIP): (no result) LEU/UL
UR MUCUS: (no result) /HPF
UR NITRITE (DIP): NEGATIVE MG/DL
UR PH (DIP): 6 (ref 5–9)
UR RBC: 2 /HPF (ref 0–5)
UR SPECIFIC GRAVITY (DIP): 1.01 (ref 1–1.03)
UR SQUAMOUS EPITHELIAL CELL: (no result) /HPF
UR TOTAL PROTEIN (DIP): NEGATIVE MG/DL
UR UROBILINOGEN (DIP): NEGATIVE MG/DL
UR WBC: 4 /HPF (ref 0–5)
WHITE BLOOD COUNT: 10.4 10^3/UL (ref 4.8–10.8)

## 2019-03-29 PROCEDURE — 88305 TISSUE EXAM BY PATHOLOGIST: CPT

## 2019-03-29 PROCEDURE — 80053 COMPREHEN METABOLIC PANEL: CPT

## 2019-03-29 PROCEDURE — 85610 PROTHROMBIN TIME: CPT

## 2019-03-29 PROCEDURE — 85730 THROMBOPLASTIN TIME PARTIAL: CPT

## 2019-03-29 PROCEDURE — 86850 RBC ANTIBODY SCREEN: CPT

## 2019-03-29 PROCEDURE — 96360 HYDRATION IV INFUSION INIT: CPT

## 2019-03-29 PROCEDURE — 0DNW0ZZ RELEASE PERITONEUM, OPEN APPROACH: ICD-10-PCS

## 2019-03-29 PROCEDURE — 86592 SYPHILIS TEST NON-TREP QUAL: CPT

## 2019-03-29 PROCEDURE — 0DNW0ZZ RELEASE PERITONEUM, OPEN APPROACH: ICD-10-PCS | Performed by: OBSTETRICS & GYNECOLOGY

## 2019-03-29 PROCEDURE — 76818 FETAL BIOPHYS PROFILE W/NST: CPT

## 2019-03-29 PROCEDURE — 81001 URINALYSIS AUTO W/SCOPE: CPT

## 2019-03-29 PROCEDURE — 85025 COMPLETE CBC W/AUTO DIFF WBC: CPT

## 2019-03-29 PROCEDURE — 86901 BLOOD TYPING SEROLOGIC RH(D): CPT

## 2019-03-29 PROCEDURE — 87086 URINE CULTURE/COLONY COUNT: CPT

## 2019-03-29 PROCEDURE — 86900 BLOOD TYPING SEROLOGIC ABO: CPT

## 2019-03-29 PROCEDURE — G0463 HOSPITAL OUTPT CLINIC VISIT: HCPCS

## 2019-03-29 PROCEDURE — 85014 HEMATOCRIT: CPT

## 2019-03-29 PROCEDURE — 85018 HEMOGLOBIN: CPT

## 2019-03-29 RX ADMIN — PYRIDOXINE HYDROCHLORIDE 1 MLS/HR: 100 INJECTION, SOLUTION INTRAMUSCULAR; INTRAVENOUS at 12:31

## 2019-03-29 RX ADMIN — Medication SCH MLS/HR: at 23:03

## 2019-03-29 RX ADMIN — Medication 1 MLS/HR: at 23:03

## 2019-03-29 RX ADMIN — PYRIDOXINE HYDROCHLORIDE 1 MLS/HR: 100 INJECTION, SOLUTION INTRAMUSCULAR; INTRAVENOUS at 23:50

## 2019-03-29 RX ADMIN — PYRIDOXINE HYDROCHLORIDE 1 MLS/HR: 100 INJECTION, SOLUTION INTRAMUSCULAR; INTRAVENOUS at 14:02

## 2019-03-29 RX ADMIN — PYRIDOXINE HYDROCHLORIDE 1 MLS/HR: 100 INJECTION, SOLUTION INTRAMUSCULAR; INTRAVENOUS at 17:19

## 2019-03-29 RX ADMIN — CEFAZOLIN SODIUM 1 MLS/HR: 2 SOLUTION INTRAVENOUS at 20:37

## 2019-03-29 RX ADMIN — AZITHROMYCIN MONOHYDRATE 1 MLS/HR: 500 INJECTION, POWDER, LYOPHILIZED, FOR SOLUTION INTRAVENOUS at 17:45

## 2019-03-29 NOTE — TRIAGE
===================================

OB Triage

===================================

Datetime Report Generated by CPN: 03/29/2019 13:39

   

   

===========================

Datetime: 03/29/2019 12:57

===========================

   

 Stage of Pregnancy:  OB Triage

   

===================================

Labor Evaluation

===================================

   

 Frequency:  IREGULAR 

 Monitor Mode:  External

 Duration (sec)2399:  50

 Pattern:  Normal: <= 5 Contractions in 10 Minutes

 [place]ing [place]:  Relaxed

   

===================================

Fetal Heart Rate

===================================

   

 FHR Baseline Rate:  145

 Monitor Mode:  External US

 Accelerations:  15X15

 Decelerations:  None

 Category:  Category I

   

===================================

Pain Assessment

===================================

   

 Pain Scale:  8

 Pain Presence:  Constant

 Pain Type:  Contraction

 Pain Location:  Abdomen

 Pain Goal:  0

 Pain Relief Measures:  Comfort Measures

   

===========================

Datetime: 03/29/2019 11:45

===========================

   

 Stage of Pregnancy:  OB Triage

   

===================================

Labor Evaluation

===================================

   

 Frequency:  IRREGULAR 

 Monitor Mode:  External

 Duration (sec)2399:  60

 Pattern:  Normal: <= 5 Contractions in 10 Minutes

 [place]ing [place]:  Relaxed

   

===================================

Fetal Heart Rate

===================================

   

 FHR Baseline Rate:  145

 Monitor Mode:  External US

 Variability:  Moderate 6-25 bpm

 Accelerations:  15X15

 Decelerations:  None

 Category:  Category I

   

===================================

Pain Assessment

===================================

   

 Pain Scale:  7

 Pain Presence:  Constant

 Pain Type:  Pressure; Ache

 Pain Location:  Abdomen

 Pain Goal:  0

 Pain Relief Measures:  Comfort Measures

   

===========================

Datetime: 03/29/2019 11:28

===========================

   

 Time of Arrival:  03/29/2019 11:13

 EGA:  37.6

 Arrived By:  Ambulatory

 Arrived From:  Home

 Chief Complaint:  C/O PRESSURE AND PAIN INCSIONAL 

 Fetal Movement:  Present

 Contractions:  Irregular

 Rupture of Membranes:  Denies

 Vaginal Bleeding:  None

 Vaginal Discharge:  Denies

 Recent Sexual Intercouse:  Denies

 Abdominal Trauma:  Not Applicable

 Patient Complaints:  Contractions

 Time Provider Notified:  03/29/2019 11:40

 Provider Notified:  DR. RAMÓN

 Initial Plan:  NST BPP UA C/S, IV HYDRATION 

   

===========================

Datetime: 03/13/2019 17:14

===========================

   

   

===================================

Labor Evaluation

===================================

   

 Frequency:  1

 Monitor Mode:  External

 Duration (sec)2399:  60

 Quality:  Mild

 [place]ing [place]:  Relaxed

   

===================================

Fetal Heart Rate

===================================

   

 FHR Baseline Rate:  140

 Monitor Mode:  External US

 FHR Baseline Changes:  No Baseline Change

 Variability:  Moderate 6-25 bpm

 Accelerations:  15X15

 Decelerations:  None

 Category:  Category I

   

===========================

Datetime: 03/13/2019 16:02

===========================

   

   

===================================

Labor Evaluation

===================================

   

 Frequency:  occasional

 Monitor Mode:  External

 Quality:  Mild

 [place]ing [place]:  Relaxed

   

===================================

Fetal Heart Rate

===================================

   

 FHR Baseline Rate:  145

 Monitor Mode:  External US

 FHR Baseline Changes:  No Baseline Change

 Variability:  Moderate 6-25 bpm

 Accelerations:  15X15

 Decelerations:  None

 Category:  Category I

 Pain Presence:  None/Denies

   

===========================

Datetime: 03/13/2019 15:06

===========================

   

   

===================================

Labor Evaluation

===================================

   

 Frequency:  occasional

 Monitor Mode:  External

 Quality:  Mild

 [place]ing [place]:  Relaxed

   

===================================

Fetal Heart Rate

===================================

   

 FHR Baseline Rate:  140

 Monitor Mode:  External US

 FHR Baseline Changes:  No Baseline Change

 Variability:  Moderate 6-25 bpm

 Accelerations:  15X15

 Decelerations:  None

 Category:  Category I

 Pain Presence:  None/Denies

   

===========================

Datetime: 03/13/2019 14:06

===========================

   

   

===================================

Labor Evaluation

===================================

   

 Frequency:  0

 Monitor Mode:  External

 [place]:  Relaxed

   

===================================

Fetal Heart Rate

===================================

   

 FHR Baseline Rate:  140

 Monitor Mode:  External US

 FHR Baseline Changes:  No Baseline Change

 Variability:  Moderate 6-25 bpm

 Accelerations:  15X15

 Decelerations:  None

 Category:  Category I

   

===========================

Datetime: 03/13/2019 12:55

===========================

   

   

===================================

Labor Evaluation

===================================

   

 Frequency:  0

 Monitor Mode:  External

 [place]ing [place]:  Relaxed

   

===================================

Fetal Heart Rate

===================================

   

 FHR Baseline Rate:  130

 Monitor Mode:  External US

 FHR Baseline Changes:  No Baseline Change

 Variability:  Moderate 6-25 bpm

 Accelerations:  15X15

 Decelerations:  None

 Category:  Category I

 Pain Presence:  None/Denies

   

===========================

Datetime: 03/13/2019 12:08

===========================

   

   

===================================

Labor Evaluation

===================================

   

 Frequency:  0

 Monitor Mode:  External

 [place]ing [place]:  Relaxed

   

===================================

Fetal Heart Rate

===================================

   

 FHR Baseline Rate:  130

 Monitor Mode:  External US

 FHR Baseline Changes:  No Baseline Change

 Variability:  Moderate 6-25 bpm

 Accelerations:  15X15

 Decelerations:  None

 Category:  Category I

   

===========================

Datetime: 03/13/2019 11:30

===========================

   

   

===================================

Labor Evaluation

===================================

   

 Frequency:  0

 Monitor Mode:  External

 [place]ing [place]:  Relaxed

   

===================================

Fetal Heart Rate

===================================

   

 FHR Baseline Rate:  130

 Monitor Mode:  External US

 FHR Baseline Changes:  No Baseline Change

 Variability:  Moderate 6-25 bpm

 Accelerations:  15X15

 Decelerations:  None

 Category:  Category I

   

===========================

Datetime: 03/13/2019 10:54

===========================

   

   

===================================

Labor Evaluation

===================================

   

 Frequency:  2

 Monitor Mode:  External

 Duration (sec)2399:  40

 Quality:  Mild

 [place]ing [place]:  Relaxed

   

===================================

Fetal Heart Rate

===================================

   

 FHR Baseline Rate:  140

 Monitor Mode:  External US

 FHR Baseline Changes:  No Baseline Change

 Variability:  Moderate 6-25 bpm

 Accelerations:  15X15

 Decelerations:  None

 Category:  Category I

   

===========================

Datetime: 03/13/2019 09:49

===========================

   

   

===================================

Labor Evaluation

===================================

   

 Frequency:  occasional

 Monitor Mode:  External

 Quality:  Mild

 [place]ing [place]:  Relaxed

   

===================================

Fetal Heart Rate

===================================

   

 FHR Baseline Rate:  140

 Monitor Mode:  External US

 FHR Baseline Changes:  No Baseline Change

 Variability:  Moderate 6-25 bpm

 Accelerations:  15X15

 Decelerations:  None

 Category:  Category I

 Pain Presence:  None/Denies

   

===========================

Datetime: 03/13/2019 07:44

===========================

   

 Assessment Type:  Ongoing Assessment

   

===================================

Maternal Assessment

===================================

   

 Level of Consciousness:  Fully Conscious

 DTR's/Clonus:  DTRs 2+; No Clonus

 Headache:  Denies

 Blurred Vision:  No

 Respiratory Effort:  Unlabored; Regular Rhythm; Equal Expansion

 Breath Sounds, Left:  Clear and Equal

 Breath Sounds, Right:  Clear and Equal

 Nausea/Vomiting:  Denies

 RUQ Epigastric Pain:  Denies

 Facial Edema:  None

   

===================================

Fall Risk Assessment

===================================

   

 History of Falling:  (0) No

 Secondary Diagnosis:  (0) No

 Ambulatory Aid:  (0) Bedrest/Nurse Assist

 IV Therapy:  (20) Yes

 Gait:  (0) Normal/Bedrest/Immobile

 Mental Status:  (0) Oriented to Own Ability

 Fall Score:  20

 Fall Risk Score Definition:  No Risk: No action required

   

===========================

Datetime: 03/13/2019 07:43

===========================

   

   

===================================

Labor Evaluation

===================================

   

 Frequency:  0

 Monitor Mode:  External

 [place]ing [place]:  Relaxed

   

===================================

Fetal Heart Rate

===================================

   

 FHR Baseline Rate:  130

 Monitor Mode:  External US

 FHR Baseline Changes:  No Baseline Change

 Variability:  Moderate 6-25 bpm

 Accelerations:  15X15

 Decelerations:  None

 Category:  Category I

 Pain Presence:  None/Denies

   

===========================

Datetime: 03/13/2019 07:00

===========================

   

 Monitor Mode:  External

 Pattern:  Normal: <= 5 Contractions in 10 Minutes

 Contraction Comments:  occasional

   

===================================

Fetal Heart Rate

===================================

   

 FHR Baseline Rate:  135

 Monitor Mode:  External US

 FHR Baseline Changes:  No Baseline Change

 Variability:  Moderate 6-25 bpm

 Accelerations:  15X15

   

===========================

Datetime: 03/13/2019 06:00

===========================

   

 Monitor Mode:  External

 Pattern:  Normal: <= 5 Contractions in 10 Minutes

   

===================================

Fetal Heart Rate

===================================

   

 FHR Baseline Rate:  125

 Monitor Mode:  External US

 FHR Baseline Changes:  No Baseline Change

 Variability:  Moderate 6-25 bpm

 Accelerations:  15X15

   

===========================

Datetime: 03/13/2019 05:00

===========================

   

   

===================================

Labor Evaluation

===================================

   

 Frequency:  X3

 Monitor Mode:  External

 Duration (sec)2399:  60

 Pattern:  Normal: <= 5 Contractions in 10 Minutes

   

===================================

Fetal Heart Rate

===================================

   

 FHR Baseline Rate:  145

 Monitor Mode:  External US

 FHR Baseline Changes:  No Baseline Change

 Variability:  Moderate 6-25 bpm

   

===========================

Datetime: 03/13/2019 04:00

===========================

   

   

===================================

Labor Evaluation

===================================

   

 Frequency:  X3

 Monitor Mode:  External

 Pattern:  Normal: <= 5 Contractions in 10 Minutes

   

===================================

Fetal Heart Rate

===================================

   

 FHR Baseline Rate:  125

 Monitor Mode:  External US

 FHR Baseline Changes:  No Baseline Change

 Variability:  Moderate 6-25 bpm

 Accelerations:  15X15

   

===========================

Datetime: 03/13/2019 03:00

===========================

   

   

===================================

Labor Evaluation

===================================

   

 Frequency:  OCC

 Monitor Mode:  External

 Pattern:  Normal: <= 5 Contractions in 10 Minutes

   

===================================

Fetal Heart Rate

===================================

   

 FHR Baseline Rate:  130

 Monitor Mode:  External US

 FHR Baseline Changes:  No Baseline Change

 Variability:  Moderate 6-25 bpm

   

===========================

Datetime: 03/13/2019 02:00

===========================

   

   

===================================

Labor Evaluation

===================================

   

 Frequency:  0

 Monitor Mode:  External

 Pattern:  Normal: <= 5 Contractions in 10 Minutes

   

===================================

Fetal Heart Rate

===================================

   

 FHR Baseline Rate:  135

 Monitor Mode:  External US

 FHR Baseline Changes:  No Baseline Change

 Variability:  Marked >25 bpm

 Accelerations:  15X15

   

===========================

Datetime: 03/13/2019 01:00

===========================

   

 Monitor Mode:  External

 Pattern:  Normal: <= 5 Contractions in 10 Minutes

   

===================================

Fetal Heart Rate

===================================

   

 FHR Baseline Rate:  135

 Monitor Mode:  External US

 FHR Baseline Changes:  No Baseline Change

 Variability:  Moderate 6-25 bpm

   

===========================

Datetime: 03/13/2019 00:15

===========================

   

 Pain Assessment Comments:  patient states pain has resolved

   

===========================

Datetime: 03/13/2019 00:00

===========================

   

   

===================================

Labor Evaluation

===================================

   

 Frequency:  occ

 Monitor Mode:  External

 Pattern:  Normal: <= 5 Contractions in 10 Minutes

   

===================================

Fetal Heart Rate

===================================

   

 FHR Baseline Rate:  135

 Monitor Mode:  External US

 FHR Baseline Changes:  No Baseline Change

 Variability:  Moderate 6-25 bpm

 Accelerations:  15X15

   

===========================

Datetime: 03/12/2019 23:36

===========================

   

 Monitor Mode:  Palpation

 [place]ing [place]:  Relaxed

   

===========================

Datetime: 03/12/2019 23:32

===========================

   

 Monitor Mode:  Palpation

 Quality:  Moderate

   

===========================

Datetime: 03/12/2019 23:05

===========================

   

 Pain Assessment Comments:  PATIENT C/O LEFT LATERAL SIDE PAIN

   

===========================

Datetime: 03/12/2019 23:00

===========================

   

 Monitor Mode:  External

 Pattern:  Normal: <= 5 Contractions in 10 Minutes

   

===================================

Fetal Heart Rate

===================================

   

 FHR Baseline Rate:  125

 Monitor Mode:  External US

 FHR Baseline Changes:  No Baseline Change

 Variability:  Moderate 6-25 bpm

 Accelerations:  15X15

   

===========================

Datetime: 03/12/2019 22:00

===========================

   

   

===================================

Labor Evaluation

===================================

   

 Frequency:  0

 Monitor Mode:  External

 Duration (sec)2399:  0

 Pattern:  Normal: <= 5 Contractions in 10 Minutes

   

===================================

Fetal Heart Rate

===================================

   

 FHR Baseline Rate:  130

 Monitor Mode:  External US

 FHR Baseline Changes:  No Baseline Change

 Variability:  Moderate 6-25 bpm

   

===========================

Datetime: 03/12/2019 21:00

===========================

   

   

===================================

Labor Evaluation

===================================

   

 Frequency:  0

 Monitor Mode:  External

 Duration (sec)2399:  0

 Pattern:  Normal: <= 5 Contractions in 10 Minutes

   

===================================

Fetal Heart Rate

===================================

   

 FHR Baseline Rate:  130

 Monitor Mode:  External US

 FHR Baseline Changes:  No Baseline Change

 Variability:  Moderate 6-25 bpm

 Decelerations:  None

 Category:  Category I

   

===========================

Datetime: 03/12/2019 20:31

===========================

   

 Assessment Type:  Ongoing Assessment

   

===================================

Maternal Assessment

===================================

   

 Level of Consciousness:  Fully Conscious

 Headache:  Denies

 Blurred Vision:  No

 Respiratory Effort:  Unlabored; Regular Rhythm; Equal Expansion

 Breath Sounds, Left:  Clear and Equal

 Breath Sounds, Right:  Clear and Equal

 Nausea/Vomiting:  Denies

 RUQ Epigastric Pain:  Denies

 Lower Extremities Edema:  None

     Degree:  None

 Upper Extremities Edema:  None

     Degree:  None

 Facial Edema:  None

   

===================================

Fall Risk Assessment

===================================

   

 History of Falling:  (0) No

 Secondary Diagnosis:  (0) No

 Ambulatory Aid:  (0) Bedrest/Nurse Assist

 IV Therapy:  (20) Yes

 Gait:  (0) Normal/Bedrest/Immobile

 Mental Status:  (0) Oriented to Own Ability

 Fall Score:  20

 Fall Risk Score Definition:  No Risk: No action required

   

===========================

Datetime: 03/12/2019 20:01

===========================

   

 Comments:  fetal movement audible

   

===========================

Datetime: 03/12/2019 20:00

===========================

   

   

===================================

Labor Evaluation

===================================

   

 Frequency:  0

 Monitor Mode:  External

 Duration (sec)2399:  0

 Pattern:  Normal: <= 5 Contractions in 10 Minutes

   

===================================

Fetal Heart Rate

===================================

   

 FHR Baseline Rate:  135

 Monitor Mode:  External US

 FHR Baseline Changes:  No Baseline Change

 Variability:  Moderate 6-25 bpm

 Accelerations:  15X15

   

===========================

Datetime: 03/12/2019 19:56

===========================

   

 Temperature Route:  Oral

   

===================================

Pain Assessment

===================================

   

 Pain Scale:  0

 Pain Presence:  None/Denies

 Pain Type:  N/A

 Pain Assessment Comments:  patient reports that she is no longer feeling abd pain or 'incisional' pa
in. 

      

   

===========================

Datetime: 03/12/2019 19:00

===========================

   

   

===================================

Labor Evaluation

===================================

   

 Frequency:  0

 Monitor Mode:  External

 [place]ing [place]:  Relaxed

   

===================================

Fetal Heart Rate

===================================

   

 FHR Baseline Rate:  140

 Monitor Mode:  External US

 FHR Baseline Changes:  No Baseline Change

 Variability:  Moderate 6-25 bpm

 Accelerations:  15X15

 Decelerations:  None

 Category:  Category I

   

===========================

Datetime: 03/12/2019 18:00

===========================

   

 Stage of Pregnancy:  Antepartum

   

===================================

Labor Evaluation

===================================

   

 Frequency:  0

 Monitor Mode:  External

 Pattern:  Normal: <= 5 Contractions in 10 Minutes

   

===================================

Fetal Heart Rate

===================================

   

 FHR Baseline Rate:  140

 Monitor Mode:  External US

 FHR Baseline Changes:  No Baseline Change

 Variability:  Moderate 6-25 bpm

 Accelerations:  15X15

 Decelerations:  None

 Category:  Category I

   

===========================

Datetime: 03/12/2019 17:02

===========================

   

   

===================================

Labor Evaluation

===================================

   

 Frequency:  OCC

 Monitor Mode:  External

 Quality:  Mild

 Pattern:  Normal: <= 5 Contractions in 10 Minutes

   

===================================

Fetal Heart Rate

===================================

   

 FHR Baseline Rate:  130

 Monitor Mode:  External US

 FHR Baseline Changes:  No Baseline Change

 Variability:  Moderate 6-25 bpm

 Accelerations:  15X15

 Decelerations:  None

 Category:  Category I

   

===========================

Datetime: 03/12/2019 17:01

===========================

   

 Stage of Pregnancy:  Antepartum

 Temperature Route:  Oral

   

===================================

Pain Assessment

===================================

   

 Pain Scale:  0

 Pain Presence:  None/Denies

 Pain Goal:  0

   

===========================

Datetime: 03/12/2019 16:00

===========================

   

   

===================================

Labor Evaluation

===================================

   

 Frequency:  OCC

 Monitor Mode:  External

 Duration (sec)2399:  60

 Quality:  Mild

 [place]ing [place]:  Relaxed

   

===================================

Fetal Heart Rate

===================================

   

 FHR Baseline Rate:  130

 Monitor Mode:  External US

 FHR Baseline Changes:  No Baseline Change

 Variability:  Moderate 6-25 bpm

 Accelerations:  15X15

 Decelerations:  None

 Category:  Category I

   

===========================

Datetime: 03/12/2019 15:00

===========================

   

   

===================================

Labor Evaluation

===================================

   

 Frequency:  none

 Monitor Mode:  External

 Pattern:  Normal: <= 5 Contractions in 10 Minutes

   

===================================

Fetal Heart Rate

===================================

   

 FHR Baseline Rate:  130

 Monitor Mode:  External US

 Variability:  Moderate 6-25 bpm

 Accelerations:  15X15

 Decelerations:  None

 Category:  Category I

   

===========================

Datetime: 03/12/2019 14:00

===========================

   

   

===================================

Labor Evaluation

===================================

   

 Frequency:  0

 Monitor Mode:  External

 Pattern:  Normal: <= 5 Contractions in 10 Minutes

 Resting [place]:  Relaxed

   

===================================

Fetal Heart Rate

===================================

   

 FHR Baseline Rate:  130

 Monitor Mode:  External US

 FHR Baseline Changes:  No Baseline Change

 Variability:  Moderate 6-25 bpm

 Accelerations:  15X15

 Decelerations:  None

 Category:  Category I

   

===========================

Datetime: 03/12/2019 13:00

===========================

   

   

===================================

Labor Evaluation

===================================

   

 Frequency:  0

 Monitor Mode:  External

 Pattern:  Normal: <= 5 Contractions in 10 Minutes

 Resting [place]:  Relaxed

   

===================================

Fetal Heart Rate

===================================

   

 FHR Baseline Rate:  130

 Monitor Mode:  External US

 FHR Baseline Changes:  No Baseline Change

 Variability:  Moderate 6-25 bpm

 Accelerations:  15X15

 Decelerations:  None

 Category:  Category I

   

===========================

Datetime: 03/12/2019 12:12

===========================

   

   

===================================

Labor Evaluation

===================================

   

 Frequency:  0

 Monitor Mode:  External

 Resting [place]:  Relaxed

   

===================================

Fetal Heart Rate

===================================

   

 FHR Baseline Rate:  140

 Monitor Mode:  External US

 FHR Baseline Changes:  No Baseline Change

 Variability:  Moderate 6-25 bpm

 Accelerations:  15X15

 Decelerations:  None

 Category:  Category I

   

===========================

Datetime: 03/12/2019 12:11

===========================

   

 Stage of Pregnancy:  Antepartum

 Temperature Route:  Oral

   

===================================

Pain Assessment

===================================

   

 Pain Scale:  0

 Pain Presence:  None/Denies

 Pain Goal:  0

   

===========================

Datetime: 03/12/2019 11:30

===========================

   

   

===================================

Labor Evaluation

===================================

   

 Frequency:  0

 Monitor Mode:  External

 Pattern:  Normal: <= 5 Contractions in 10 Minutes

 Resting [place]:  Relaxed

   

===================================

Fetal Heart Rate

===================================

   

 FHR Baseline Rate:  140

 Monitor Mode:  External US

 FHR Baseline Changes:  No Baseline Change

 Variability:  Moderate 6-25 bpm

 Accelerations:  15X15

 Decelerations:  None

 Category:  Category I

   

===========================

Datetime: 03/12/2019 10:30

===========================

   

   

===================================

Labor Evaluation

===================================

   

 Frequency:  0

 Monitor Mode:  External

 Pattern:  Normal: <= 5 Contractions in 10 Minutes

 [place]ing [place]:  Relaxed

   

===================================

Fetal Heart Rate

===================================

   

 FHR Baseline Rate:  140

 Monitor Mode:  External US

 FHR Baseline Changes:  No Baseline Change

 Variability:  Moderate 6-25 bpm

 Accelerations:  15X15

 Decelerations:  None

 Category:  Category I

   

===========================

Datetime: 03/12/2019 09:37

===========================

   

   

===================================

Labor Evaluation

===================================

   

 Frequency:  OCC

 Monitor Mode:  External

 Duration (sec)2399:  40

 Quality:  Mild

 Pattern:  Normal: <= 5 Contractions in 10 Minutes

 [place]ing [place]:  Relaxed

   

===================================

Fetal Heart Rate

===================================

   

 FHR Baseline Rate:  130

 Monitor Mode:  External US

 FHR Baseline Changes:  No Baseline Change

 Variability:  Moderate 6-25 bpm

 Accelerations:  15X15

 Decelerations:  None

 Category:  Category I

   

===========================

Datetime: 03/12/2019 09:31

===========================

   

 Stage of Pregnancy:  Antepartum

   

===========================

Datetime: 03/12/2019 08:37

===========================

   

 Assessment Type:  Ongoing Assessment

   

===================================

Maternal Assessment

===================================

   

 Level of Consciousness:  Fully Conscious

 DTR's/Clonus:  DTRs 2+; No Clonus

 Headache:  Denies

 Blurred Vision:  No

 Respiratory Effort:  Unlabored; Regular Rhythm; Equal Expansion

 Breath Sounds, Left:  Clear and Equal

 Breath Sounds, Right:  Clear and Equal

 Nausea/Vomiting:  Denies

 RUQ Epigastric Pain:  Denies

 Lower Extremities Edema:  None

     Degree:  None

 Upper Extremities Edema:  None

     Degree:  None

 Facial Edema:  None

   

===================================

Fall Risk Assessment

===================================

   

 History of Falling:  (0) No

 Secondary Diagnosis:  (0) No

 Ambulatory Aid:  (0) Bedrest/Nurse Assist

 IV Therapy:  (20) Yes

 Gait:  (0) Normal/Bedrest/Immobile

 Mental Status:  (0) Oriented to Own Ability

 Fall Score:  20

 Fall Risk Score Definition:  No Risk: No action required

   

===========================

Datetime: 03/12/2019 08:33

===========================

   

   

===================================

Labor Evaluation

===================================

   

 Frequency:  OCC

 Monitor Mode:  External

 Quality:  Mild

 Pattern:  Normal: <= 5 Contractions in 10 Minutes

 [place]ing [place]:  Relaxed

 Contraction Comments:  DOES NOT FEEL CONTRACTIONS

   

===================================

Fetal Heart Rate

===================================

   

 FHR Baseline Rate:  135

 Monitor Mode:  External US

 FHR Baseline Changes:  No Baseline Change

 Variability:  Moderate 6-25 bpm

 Accelerations:  15X15

 Decelerations:  None

 Category:  Category I

   

===========================

Datetime: 03/12/2019 08:22

===========================

   

 Stage of Pregnancy:  Antepartum

   

===========================

Datetime: 03/12/2019 08:15

===========================

   

 Stage of Pregnancy:  Antepartum

 Temperature Route:  Oral

   

===================================

Pain Assessment

===================================

   

 Pain Scale:  0

 Pain Presence:  None/Denies

 Pain Goal:  0

   

===========================

Datetime: 03/12/2019 07:00

===========================

   

   

===================================

Labor Evaluation

===================================

   

 Frequency:  x1 with irritability

 Monitor Mode:  External

 Duration (sec)2399:  40

   

===================================

Fetal Heart Rate

===================================

   

 FHR Baseline Rate:  130

 Monitor Mode:  External US

 Variability:  Moderate 6-25 bpm

 Accelerations:  15X15

 Decelerations:  None

 Category:  Category I

   

===========================

Datetime: 03/12/2019 06:00

===========================

   

   

===================================

Labor Evaluation

===================================

   

 Frequency:  irritability

 Monitor Mode:  External

   

===================================

Fetal Heart Rate

===================================

   

 FHR Baseline Rate:  125

 Monitor Mode:  External US

 Variability:  Moderate 6-25 bpm

 Accelerations:  15X15

 Decelerations:  Variable

 Category:  Category II

 Comments:  Some loss of contact

   

===========================

Datetime: 03/12/2019 05:21

===========================

   

 [place]ing [place]:  Relaxed

 Contraction Comments:  Abdomen soft on palpation. No contractions palpated at this time.

   

===================================

Pain Assessment

===================================

   

 Pain Scale:  0

 Pain Presence:  None/Denies

 Pain Type:  N/A

   

===========================

Datetime: 03/12/2019 05:00

===========================

   

   

===================================

Labor Evaluation

===================================

   

 Frequency:  none

 Monitor Mode:  External

   

===================================

Fetal Heart Rate

===================================

   

 FHR Baseline Rate:  130

 Monitor Mode:  External US

 Variability:  Moderate 6-25 bpm

 Accelerations:  15X15

 Decelerations:  None

 Category:  Category I

 Comments:  Some loss of contact

   

===========================

Datetime: 03/12/2019 04:34

===========================

   

 [place]ing [place]:  Relaxed

 Contraction Comments:  Abdomen soft on palpation. No contractions palpated at this time. 

 Comments:  Active fetal movement noted on palpation.

   

===========================

Datetime: 03/12/2019 04:05

===========================

   

 Stage of Pregnancy:  Antepartum

 Temperature Route:  Oral

 Comments:  Patient states she feels active fetal movement

   

===================================

Pain Assessment

===================================

   

 Pain Scale:  0

 Pain Presence:  None/Denies

 Pain Type:  N/A

 Pain Assessment Comments:  Patient is sleeping in between care. Patient appears comforable. 

   

===========================

Datetime: 03/12/2019 04:00

===========================

   

   

===================================

Labor Evaluation

===================================

   

 Frequency:  x1 with irritability

 Monitor Mode:  External

 Duration (sec)2399:  80

   

===================================

Fetal Heart Rate

===================================

   

 FHR Baseline Rate:  120

 Monitor Mode:  External US

 Variability:  Moderate 6-25 bpm

 Accelerations:  15X15

 Decelerations:  None

 Category:  Category I

 Comments:  Some loss of contact

   

===========================

Datetime: 03/12/2019 03:08

===========================

   

 Pain Assessment Comments:  Patient is sleeping and appears comfortable. 

   

===========================

Datetime: 03/12/2019 03:00

===========================

   

   

===================================

Labor Evaluation

===================================

   

 Frequency:  x4

 Monitor Mode:  External

 Duration (sec)2399:  40-70

   

===================================

Fetal Heart Rate

===================================

   

 FHR Baseline Rate:  120

 Monitor Mode:  External US

 Variability:  Moderate 6-25 bpm

 Accelerations:  15X15

 Decelerations:  None

 Category:  Category I

   

===========================

Datetime: 03/12/2019 02:25

===========================

   

 Resting [place]:  Relaxed

 Contraction Comments:  Abdomen soft on palpation. No contractions palpated at this time. 

   

===================================

Pain Assessment

===================================

   

 Pain Scale:  0

 Pain Presence:  None/Denies

 Pain Type:  N/A

 Pain Assessment Comments:  Patient is sleeping in between care. Patient appears comfortable. 

   

===========================

Datetime: 03/12/2019 02:00

===========================

   

   

===================================

Labor Evaluation

===================================

   

 Frequency:  x4

 Monitor Mode:  External

 Duration (sec)2399:  40-70

   

===================================

Fetal Heart Rate

===================================

   

 FHR Baseline Rate:  120

 Monitor Mode:  External US

 Variability:  Moderate 6-25 bpm

 Accelerations:  Prolonged

 Decelerations:  None

 Category:  Category I

 Comments:  Some loss of contact

   

===========================

Datetime: 03/12/2019 01:00

===========================

   

   

===================================

Labor Evaluation

===================================

   

 Frequency:  x5 with irritability

 Monitor Mode:  External

 Duration (sec)2399:  

   

===================================

Fetal Heart Rate

===================================

   

 FHR Baseline Rate:  125

 Monitor Mode:  External US

 Variability:  Moderate 6-25 bpm

 Accelerations:  15X15

 Decelerations:  Late

 Category:  Category II

 Comments:  Some loss of contact

   

===========================

Datetime: 03/12/2019 00:51

===========================

   

 [place]ing [place]:  Relaxed

 Contraction Comments:  Abdomen soft on palpation. No contractions palpated

   

===================================

Pain Assessment

===================================

   

 Pain Scale:  0

 Pain Presence:  None/Denies

 Pain Type:  N/A

 Pain Assessment Comments:  Patient sleeping in between care. 

   

===========================

Datetime: 03/12/2019 00:00

===========================

   

   

===================================

Labor Evaluation

===================================

   

 Frequency:  x1 with irritability

 Monitor Mode:  External

 Duration (sec)2399:  50

   

===================================

Fetal Heart Rate

===================================

   

 FHR Baseline Rate:  125

 Monitor Mode:  External US

 Variability:  Moderate 6-25 bpm

 Accelerations:  Prolonged

 Decelerations:  None

 Category:  Category I

   

===========================

Datetime: 03/11/2019 23:57

===========================

   

 Stage of Pregnancy:  Antepartum

 Temperature Route:  Oral

   

===================================

Pain Assessment

===================================

   

 Pain Scale:  0

 Pain Presence:  None/Denies

 Pain Type:  N/A

 Pain Assessment Comments:  Patient denies feeling contractions at this time (Annotations: Patient sl
eeping in between care. )

   

===========================

Datetime: 03/11/2019 23:00

===========================

   

   

===================================

Labor Evaluation

===================================

   

 Frequency:  none

 Monitor Mode:  External

   

===================================

Fetal Heart Rate

===================================

   

 FHR Baseline Rate:  130

 Monitor Mode:  External US

 Variability:  Moderate 6-25 bpm

 Accelerations:  15X15

 Decelerations:  None

 Category:  Category I

 Comments:  Some loss of contact

   

===========================

Datetime: 03/11/2019 22:15

===========================

   

   

===================================

Pain Assessment

===================================

   

 Pain Scale:  0

 Pain Presence:  None/Denies

 Pain Type:  N/A

   

===========================

Datetime: 03/11/2019 22:00

===========================

   

   

===================================

Labor Evaluation

===================================

   

 Frequency:  x2

 Monitor Mode:  External

 Duration (sec)2399:  40-70

   

===================================

Fetal Heart Rate

===================================

   

 FHR Baseline Rate:  130

 Monitor Mode:  External US

 Variability:  Moderate 6-25 bpm

 Accelerations:  15X15

 Decelerations:  None

 Category:  Category I

 Comments:  Some loss of contact

   

===========================

Datetime: 03/11/2019 21:52

===========================

   

 [place]ing [place]:  Relaxed

 Contraction Comments:  Abdomen soft on palpation. No contractions palpated at this time. 

 Comments:  Active fetal movement noted per palpation at this time. Patient states she feels active f
etal movement.  

   

===================================

Pain Assessment

===================================

   

 Pain Scale:  0

 Pain Presence:  None/Denies

 Pain Type:  N/A

   

===========================

Datetime: 03/11/2019 21:23

===========================

   

 Resting [place]:  Relaxed

   

===================================

Pain Assessment

===================================

   

 Pain Scale:  0

 Pain Presence:  None/Denies

 Pain Type:  N/A

   

===========================

Datetime: 03/11/2019 21:00

===========================

   

   

===================================

Labor Evaluation

===================================

   

 Frequency:  x3

 Monitor Mode:  External

 Duration (sec)2399:  

   

===================================

Fetal Heart Rate

===================================

   

 FHR Baseline Rate:  135

 Monitor Mode:  External US

 Variability:  Moderate 6-25 bpm

 Accelerations:  Prolonged

 Decelerations:  None

 Category:  Category I

   

===========================

Datetime: 03/11/2019 20:07

===========================

   

   

===================================

Pain Assessment

===================================

   

 Pain Scale:  0

 Pain Presence:  None/Denies

 Pain Type:  N/A

   

===========================

Datetime: 03/11/2019 20:00

===========================

   

   

===================================

Labor Evaluation

===================================

   

 Frequency:  none

 Monitor Mode:  External

   

===================================

Fetal Heart Rate

===================================

   

 FHR Baseline Rate:  135

 Monitor Mode:  External US

 Variability:  Moderate 6-25 bpm

 Accelerations:  Prolonged

 Decelerations:  None

 Category:  Category I

 Comments:  Some loss of contact

   

===========================

Datetime: 03/11/2019 19:20

===========================

   

 Stage of Pregnancy:  Antepartum

 Assessment Type:  Ongoing Assessment

   

===================================

Maternal Assessment

===================================

   

 Level of Consciousness:  Fully Conscious

 DTR's/Clonus:  DTRs 2+; No Clonus

 Headache:  Denies

 Blurred Vision:  No

 Respiratory Effort:  Unlabored; Regular Rhythm; Equal Expansion

 Breath Sounds, Left:  Clear and Equal

 Breath Sounds, Right:  Clear and Equal

 Nausea/Vomiting:  Denies

 RUQ Epigastric Pain:  Denies

 Lower Extremities Edema:  None

     Degree:  None

 Upper Extremities Edema:  None

     Degree:  None

 Facial Edema:  None

 Temperature Route:  Oral

   

===================================

Fall Risk Assessment

===================================

   

 History of Falling:  (0) No

 Secondary Diagnosis:  (0) No

 Ambulatory Aid:  (0) Bedrest/Nurse Assist

 IV Therapy:  (20) Yes

 Gait:  (0) Normal/Bedrest/Immobile

 Mental Status:  (0) Oriented to Own Ability

 Fall Score:  20

 Fall Risk Score Definition:  No Risk: No action required

 Comments:  Patient states she feels active fetal  movement.

   

===================================

Pain Assessment

===================================

   

 Pain Scale:  7

 Pain Presence:  Intermittent

 Pain Type:  Ache

 Pain Location:  Abdomen

 Pain Relief Measures:  Comfort Measures

 Pain Assessment Comments:  Patient denies feeling contractions at this time. 

   

===========================

Datetime: 03/11/2019 19:17

===========================

   

 [place]ing [place]:  Relaxed

 Contraction Comments:  Abdomen soft on palpation. 

   

===========================

Datetime: 03/11/2019 18:48

===========================

   

   

===================================

Labor Evaluation

===================================

   

 Frequency:  2

 Monitor Mode:  External

 Duration (sec)2399:  

 Quality:  Mild

 Resting [place]:  Relaxed

   

===================================

Fetal Heart Rate

===================================

   

 FHR Baseline Rate:  130

 Monitor Mode:  External US

 FHR Baseline Changes:  No Baseline Change

 Variability:  Moderate 6-25 bpm

 Accelerations:  15X15

 Decelerations:  None

 Category:  Category I

   

===========================

Datetime: 03/11/2019 18:00

===========================

   

   

===================================

Labor Evaluation

===================================

   

 Frequency:  0ccasional irritability

 Monitor Mode:  External

 Quality:  Mild

 Resting [place]:  Relaxed

   

===================================

Fetal Heart Rate

===================================

   

 FHR Baseline Rate:  140

 Monitor Mode:  External US

 FHR Baseline Changes:  No Baseline Change

 Variability:  Moderate 6-25 bpm

 Accelerations:  15X15

 Decelerations:  None

 Category:  Category I

   

===================================

Pain Assessment

===================================

   

 Pain Scale:  2

 Pain Presence:  Intermittent

 Pain Type:  Contraction

   

===========================

Datetime: 03/11/2019 16:48

===========================

   

   

===================================

Labor Evaluation

===================================

   

 Frequency:  1

 Monitor Mode:  External

 Duration (sec)2399:  50

 Quality:  Mild

 Resting [place]:  Relaxed

   

===================================

Fetal Heart Rate

===================================

   

 FHR Baseline Rate:  130

 Monitor Mode:  External US

 FHR Baseline Changes:  No Baseline Change

 Variability:  Moderate 6-25 bpm

 Accelerations:  15X15

 Decelerations:  None

 Category:  Category I

   

===========================

Datetime: 03/11/2019 16:04

===========================

   

   

===================================

Labor Evaluation

===================================

   

 Frequency:  0

 Monitor Mode:  External

 Resting [place]:  Relaxed

   

===================================

Fetal Heart Rate

===================================

   

 FHR Baseline Rate:  140

 Monitor Mode:  External US

 FHR Baseline Changes:  No Baseline Change

 Variability:  Moderate 6-25 bpm

 Accelerations:  15X15

 Decelerations:  None

 Category:  Category I

   

===========================

Datetime: 03/11/2019 15:03

===========================

   

   

===================================

Labor Evaluation

===================================

   

 Frequency:  2

 Monitor Mode:  External

 Duration (sec)2399:  40

 Quality:  Mild

 Resting [place]:  Relaxed

   

===================================

Fetal Heart Rate

===================================

   

 FHR Baseline Rate:  130

 Monitor Mode:  External US

 FHR Baseline Changes:  No Baseline Change

 Variability:  Moderate 6-25 bpm

 Accelerations:  15X15

 Decelerations:  None

 Category:  Category I

   

===========================

Datetime: 03/11/2019 14:02

===========================

   

   

===================================

Labor Evaluation

===================================

   

 Frequency:  1

 Monitor Mode:  External

 Duration (sec)2399:  60

 Quality:  Mild

 Resting [place]:  Relaxed

   

===================================

Fetal Heart Rate

===================================

   

 FHR Baseline Rate:  130

 Monitor Mode:  External US

 FHR Baseline Changes:  No Baseline Change

 Variability:  Moderate 6-25 bpm

 Accelerations:  15X15

 Decelerations:  None

 Category:  Category I

   

===========================

Datetime: 03/11/2019 13:51

===========================

   

 Pain Assessment Comments:  pt continues to complain of intermittent pain in incision site

   

===========================

Datetime: 03/11/2019 13:01

===========================

   

   

===================================

Labor Evaluation

===================================

   

 Frequency:  0

 Monitor Mode:  External

 Resting [place]:  Relaxed

   

===================================

Fetal Heart Rate

===================================

   

 FHR Baseline Rate:  130

 Monitor Mode:  External US

 FHR Baseline Changes:  No Baseline Change

 Variability:  Moderate 6-25 bpm

 Accelerations:  15X15

 Decelerations:  Late; Variable

 Category:  Category II

   

===========================

Datetime: 03/11/2019 12:02

===========================

   

   

===================================

Labor Evaluation

===================================

   

 Frequency:  irritability

 Monitor Mode:  External

 Quality:  Mild

 Resting [place]:  Relaxed

   

===================================

Fetal Heart Rate

===================================

   

 FHR Baseline Rate:  150

 Monitor Mode:  External US

 FHR Baseline Changes:  No Baseline Change

 Variability:  Moderate 6-25 bpm

 Accelerations:  15X15

 Decelerations:  None

 Category:  Category I

   

===========================

Datetime: 03/11/2019 11:43

===========================

   

   

===================================

Pain Assessment

===================================

   

 Pain Scale:  9

 Pain Type:  Contraction

   

===========================

Datetime: 03/11/2019 09:58

===========================

   

   

===================================

Labor Evaluation

===================================

   

 Frequency:  x3

 Monitor Mode:  External

 Duration (sec)2399:  60

 Quality:  Mild

 Pattern:  Normal: <= 5 Contractions in 10 Minutes

 Resting [place]:  Relaxed

 Contraction Comments:  uc with irritability

   

===================================

Fetal Heart Rate

===================================

   

 FHR Baseline Rate:  145

 Monitor Mode:  External US

 Variability:  Moderate 6-25 bpm

 Accelerations:  15X15

 Decelerations:  None

 Category:  Category I

   

===========================

Datetime: 03/11/2019 08:59

===========================

   

   

===================================

Labor Evaluation

===================================

   

 Frequency:  OCC

 Monitor Mode:  External

 Duration (sec)2399:  50-60

 Quality:  Mild

 Pattern:  Normal: <= 5 Contractions in 10 Minutes

 Resting [place]:  Relaxed

   

===================================

Fetal Heart Rate

===================================

   

 FHR Baseline Rate:  135

 Monitor Mode:  External US

 Variability:  Moderate 6-25 bpm

 Accelerations:  15X15

 Decelerations:  None

 Category:  Category I

   

===========================

Datetime: 03/11/2019 07:58

===========================

   

   

===================================

Labor Evaluation

===================================

   

 Frequency:  x2

 Monitor Mode:  External

 Duration (sec)2399:  60-70

 Quality:  Mild

 Pattern:  Normal: <= 5 Contractions in 10 Minutes

 Resting [place]:  Relaxed

 Contraction Comments:  uc with irritability

   

===================================

Fetal Heart Rate

===================================

   

 FHR Baseline Rate:  135

 Monitor Mode:  External US

 Variability:  Moderate 6-25 bpm

 Accelerations:  15X15

 Decelerations:  None

 Category:  Category I

   

===================================

Pain Assessment

===================================

   

 Pain Scale:  8

 Pain Presence:  Intermittent

 Pain Type:  Pressure

 Pain Location:  Abdomen

 Pain Goal:  3

 Pain Assessment Comments:  lower abd. pain

   

===========================

Datetime: 03/11/2019 07:26

===========================

   

 Assessment Type:  Ongoing Assessment

   

===================================

Maternal Assessment

===================================

   

 Level of Consciousness:  Fully Conscious

 DTR's/Clonus:  DTRs 2+; No Clonus

 Headache:  Denies

 Blurred Vision:  No

 Respiratory Effort:  Unlabored; Regular Rhythm; Equal Expansion

 Breath Sounds, Left:  Clear and Equal

 Breath Sounds, Right:  Clear and Equal

 Nausea/Vomiting:  Denies

 RUQ Epigastric Pain:  Denies

 Lower Extremities Edema:  None

     Degree:  None

 Upper Extremities Edema:  None

 Facial Edema:  None

   

===================================

Fall Risk Assessment

===================================

   

 History of Falling:  (0) No

 Secondary Diagnosis:  (0) No

 Ambulatory Aid:  (0) Bedrest/Nurse Assist

 IV Therapy:  (20) Yes

 Gait:  (0) Normal/Bedrest/Immobile

 Mental Status:  (0) Oriented to Own Ability

 Fall Score:  20

 Fall Risk Score Definition:  No Risk: No action required

   

===========================

Datetime: 03/11/2019 07:00

===========================

   

 Stage of Pregnancy:  Antepartum

   

===================================

Labor Evaluation

===================================

   

 Frequency:  irregular

 Monitor Mode:  External

 Pattern:  Normal: <= 5 Contractions in 10 Minutes

 [place]ing [place]:  Relaxed

   

===================================

Fetal Heart Rate

===================================

   

 FHR Baseline Rate:  135

 Monitor Mode:  External US

 Variability:  Moderate 6-25 bpm

 Accelerations:  15X15

 Decelerations:  None

 Category:  Category I

   

===========================

Datetime: 03/11/2019 06:27

===========================

   

 Assessment Type:  Ongoing Assessment

   

===========================

Datetime: 03/11/2019 06:00

===========================

   

 Stage of Pregnancy:  Antepartum

   

===================================

Labor Evaluation

===================================

   

 Frequency:  irregular

 Monitor Mode:  External

 Pattern:  Normal: <= 5 Contractions in 10 Minutes

 [place]ing [place]:  Relaxed

   

===================================

Fetal Heart Rate

===================================

   

 FHR Baseline Rate:  135

 Monitor Mode:  External US

 Variability:  Moderate 6-25 bpm

 Accelerations:  15X15

 Decelerations:  None

 Category:  Category I

   

===========================

Datetime: 03/11/2019 05:00

===========================

   

 Stage of Pregnancy:  Antepartum

   

===================================

Labor Evaluation

===================================

   

 Frequency:  irregular

 Monitor Mode:  External

 Pattern:  Normal: <= 5 Contractions in 10 Minutes

 [place]ing [place]:  Relaxed

   

===================================

Fetal Heart Rate

===================================

   

 FHR Baseline Rate:  125

 Monitor Mode:  External US

 Variability:  Moderate 6-25 bpm

 Accelerations:  15X15

 Decelerations:  Variable

 Category:  Category II

 Comments:  possible loss of contact picking up maternal heart rate.

   

===========================

Datetime: 03/11/2019 04:30

===========================

   

 Stage of Pregnancy:  Antepartum

   

===================================

Labor Evaluation

===================================

   

 Frequency:  irregular

 Monitor Mode:  External

 Pattern:  Normal: <= 5 Contractions in 10 Minutes

 Resting [place]:  Relaxed

   

===================================

Fetal Heart Rate

===================================

   

 FHR Baseline Rate:  130

 Monitor Mode:  External US

 Variability:  Moderate 6-25 bpm

 Accelerations:  15X15

 Decelerations:  None

 Category:  Category I

   

===========================

Datetime: 03/11/2019 03:34

===========================

   

 Monitor Mode:  External

 Monitor Mode:  External US

   

===========================

Datetime: 03/11/2019 03:20

===========================

   

 Stage of Pregnancy:  Antepartum

   

===================================

Labor Evaluation

===================================

   

 Frequency:  irregular

 Monitor Mode:  External

 Pattern:  Normal: <= 5 Contractions in 10 Minutes

 [place]ing [place]:  Relaxed

   

===================================

Fetal Heart Rate

===================================

   

 FHR Baseline Rate:  135

 Monitor Mode:  External US

 Variability:  Moderate 6-25 bpm

 Accelerations:  15X15

 Decelerations:  None

 Category:  Category I

   

===========================

Datetime: 03/11/2019 03:07

===========================

   

 Stage of Pregnancy:  Antepartum

 Temperature Route:  Oral

   

===========================

Datetime: 03/11/2019 02:30

===========================

   

 Stage of Pregnancy:  Antepartum

   

===================================

Labor Evaluation

===================================

   

 Frequency:  irregular

 Monitor Mode:  External

 Pattern:  Normal: <= 5 Contractions in 10 Minutes

 Resting [place]:  Relaxed

   

===================================

Fetal Heart Rate

===================================

   

 FHR Baseline Rate:  135

 Monitor Mode:  External US

 Variability:  Moderate 6-25 bpm

 Accelerations:  15X15

 Decelerations:  Variable (Annotations: variable decel x1 in 1hr)

 Category:  Category II

   

===========================

Datetime: 03/11/2019 01:30

===========================

   

 Stage of Pregnancy:  Antepartum

   

===================================

Labor Evaluation

===================================

   

 Frequency:  irregular

 Monitor Mode:  External

 Pattern:  Normal: <= 5 Contractions in 10 Minutes

 Resting [place]:  Relaxed

   

===================================

Fetal Heart Rate

===================================

   

 FHR Baseline Rate:  135

 Monitor Mode:  External US

 Variability:  Moderate 6-25 bpm

 Accelerations:  15X15

 Decelerations:  None

 Category:  Category I

   

===========================

Datetime: 03/11/2019 00:30

===========================

   

 Stage of Pregnancy:  Antepartum

   

===================================

Labor Evaluation

===================================

   

 Frequency:  irregular

 Monitor Mode:  External

 Pattern:  Normal: <= 5 Contractions in 10 Minutes

 Resting [place]:  Relaxed

   

===================================

Fetal Heart Rate

===================================

   

 FHR Baseline Rate:  145

 Monitor Mode:  External US

 Variability:  Moderate 6-25 bpm

 Accelerations:  15X15

 Decelerations:  None

 Category:  Category I

   

===========================

Datetime: 03/10/2019 23:30

===========================

   

 Stage of Pregnancy:  Antepartum

   

===================================

Labor Evaluation

===================================

   

 Frequency:  irregular

 Monitor Mode:  External

 Pattern:  Normal: <= 5 Contractions in 10 Minutes

 Resting [place]:  Relaxed

   

===================================

Fetal Heart Rate

===================================

   

 FHR Baseline Rate:  145

 Monitor Mode:  External US

 Variability:  Moderate 6-25 bpm

 Accelerations:  15X15

 Decelerations:  None

 Category:  Category I

   

===========================

Datetime: 03/10/2019 23:05

===========================

   

 Stage of Pregnancy:  Antepartum

 Temperature Route:  Oral

   

===========================

Datetime: 03/10/2019 23:03

===========================

   

 Monitor Mode:  External

 Monitor Mode:  External US

   

===========================

Datetime: 03/10/2019 22:30

===========================

   

 Stage of Pregnancy:  Antepartum

   

===================================

Labor Evaluation

===================================

   

 Frequency:  irregular

 Monitor Mode:  External

 Pattern:  Normal: <= 5 Contractions in 10 Minutes

 Resting [place]:  Relaxed

   

===================================

Fetal Heart Rate

===================================

   

 FHR Baseline Rate:  145

 Monitor Mode:  External US

 Variability:  Moderate 6-25 bpm

 Accelerations:  15X15

 Decelerations:  None

 Category:  Category I

   

===========================

Datetime: 03/10/2019 22:12

===========================

   

 Monitor Mode:  External

 Monitor Mode:  External US

   

===========================

Datetime: 03/10/2019 21:30

===========================

   

 Stage of Pregnancy:  Antepartum

   

===================================

Labor Evaluation

===================================

   

 Frequency:  irregular

 Monitor Mode:  External

 Pattern:  Normal: <= 5 Contractions in 10 Minutes

 Resting [place]:  Relaxed

   

===================================

Fetal Heart Rate

===================================

   

 FHR Baseline Rate:  145

 Monitor Mode:  External US

 Variability:  Moderate 6-25 bpm

 Accelerations:  15X15

 Decelerations:  None

 Category:  Category I

   

===========================

Datetime: 03/10/2019 20:30

===========================

   

 Stage of Pregnancy:  Antepartum

   

===================================

Labor Evaluation

===================================

   

 Frequency:  irregular

 Monitor Mode:  External

 Pattern:  Normal: <= 5 Contractions in 10 Minutes

 Resting [place]:  Relaxed

   

===================================

Fetal Heart Rate

===================================

   

 FHR Baseline Rate:  150

 Monitor Mode:  External US

 Variability:  Moderate 6-25 bpm

 Accelerations:  15X15

 Decelerations:  None

 Category:  Category I

   

===========================

Datetime: 03/10/2019 19:30

===========================

   

 Stage of Pregnancy:  Antepartum

   

===================================

Labor Evaluation

===================================

   

 Frequency:  occassional

 Monitor Mode:  External

 Duration (sec)2399:  50-70

      

 Pattern:  Normal: <= 5 Contractions in 10 Minutes

 Resting [place]:  Relaxed

   

===================================

Fetal Heart Rate

===================================

   

 FHR Baseline Rate:  140

 Monitor Mode:  External US

 Variability:  Moderate 6-25 bpm

 Accelerations:  15X15

 Decelerations:  None

 Category:  Category I

   

===========================

Datetime: 03/10/2019 19:20

===========================

   

 Stage of Pregnancy:  Antepartum

   

===========================

Datetime: 03/10/2019 19:10

===========================

   

 Stage of Pregnancy:  Antepartum

 Assessment Type:  Ongoing Assessment

   

===================================

Maternal Assessment

===================================

   

 Level of Consciousness:  Fully Conscious

 DTR's/Clonus:  DTRs 2+; No Clonus

 Headache:  Denies

 Blurred Vision:  No

 Respiratory Effort:  Unlabored; Regular Rhythm; Equal Expansion

 Breath Sounds, Left:  Clear and Equal

 Breath Sounds, Right:  Clear and Equal

 Nausea/Vomiting:  Denies

 RUQ Epigastric Pain:  Denies

 Facial Edema:  None

 Temperature Route:  Oral

   

===================================

Fall Risk Assessment

===================================

   

 History of Falling:  (0) No

 Secondary Diagnosis:  (0) No

 Ambulatory Aid:  (0) Bedrest/Nurse Assist

 IV Therapy:  (20) Yes

 Gait:  (0) Normal/Bedrest/Immobile

 Mental Status:  (0) Oriented to Own Ability

 Fall Score:  20

 Fall Risk Score Definition:  No Risk: No action required

   

===========================

Datetime: 03/10/2019 18:52

===========================

   

   

===================================

Labor Evaluation

===================================

   

 Frequency:  x2

 Monitor Mode:  External

 Duration (sec)2399:  40

 Quality:  Mild

 Resting [place]:  Relaxed

 Fetal Interventions:  Side to Side

   

===================================

Fetal Heart Rate

===================================

   

 FHR Baseline Rate:  140

 Monitor Mode:  External US

 FHR Baseline Changes:  No Baseline Change

 Variability:  Moderate 6-25 bpm

 Accelerations:  15X15

 Decelerations:  Variable

 Category:  Category II

   

===========================

Datetime: 03/10/2019 18:00

===========================

   

   

===================================

Labor Evaluation

===================================

   

 Frequency:  x3

 Monitor Mode:  External

 Duration (sec)2399:  40-60

 Quality:  Mild

 Resting [place]:  Relaxed

 Fetal Interventions:  Side to Side

 Contraction Comments:  pt denies feeling UCs

   

===================================

Fetal Heart Rate

===================================

   

 FHR Baseline Rate:  135

 Monitor Mode:  External US

 FHR Baseline Changes:  No Baseline Change

 Variability:  Moderate 6-25 bpm

 Accelerations:  15X15

 Decelerations:  Variable

 Category:  Category II

   

===========================

Datetime: 03/10/2019 17:00

===========================

   

   

===================================

Labor Evaluation

===================================

   

 Frequency:  x6

 Monitor Mode:  External

 Duration (sec)2399:  30-40

 Quality:  Mild

 Resting [place]:  Relaxed

   

===================================

Fetal Heart Rate

===================================

   

 FHR Baseline Rate:  140

 Monitor Mode:  External US

 FHR Baseline Changes:  No Baseline Change

 Variability:  Moderate 6-25 bpm

 Accelerations:  15X15

 Decelerations:  Variable

 Category:  Category II

   

===========================

Datetime: 03/10/2019 16:00

===========================

   

   

===================================

Labor Evaluation

===================================

   

 Frequency:  x5

 Monitor Mode:  External

 Duration (sec)2399:  30-50

 Quality:  Mild

 Resting [place]:  Relaxed

   

===================================

Fetal Heart Rate

===================================

   

 FHR Baseline Rate:  135

 Monitor Mode:  External US

 FHR Baseline Changes:  No Baseline Change

 Variability:  Moderate 6-25 bpm

 Accelerations:  15X15

 Decelerations:  Variable

 Category:  Category II

   

===========================

Datetime: 03/10/2019 15:16

===========================

   

 Stage of Pregnancy:  Antepartum

 Temperature Route:  Oral

   

===================================

Pain Assessment

===================================

   

 Pain Scale:  0

 Pain Presence:  None/Denies

 Pain Type:  N/A

   

===========================

Datetime: 03/10/2019 15:00

===========================

   

 Stage of Pregnancy:  Antepartum

   

===================================

Maternal Assessment

===================================

   

 Level of Consciousness:  Fully Conscious

 DTR's/Clonus:  DTRs 1+

 Headache:  Denies

 Breath Sounds, Left:  Clear and Equal

 Breath Sounds, Right:  Clear and Equal

 Nausea/Vomiting:  Denies

 RUQ Epigastric Pain:  Denies

   

===================================

Labor Evaluation

===================================

   

 Frequency:  X4

      

 Monitor Mode:  External

 Duration (sec)2399:  40-60

 Quality:  Mild

 Pattern:  Normal: <= 5 Contractions in 10 Minutes

 Resting [place]:  Relaxed

 Fetal Interventions:  Side to Side

   

===================================

Fetal Heart Rate

===================================

   

 FHR Baseline Rate:  135

 Monitor Mode:  External US

 Variability:  Moderate 6-25 bpm

 Accelerations:  15X15

   

===================================

Pain Assessment

===================================

   

 Pain Scale:  3

 Pain Presence:  Constant

 Pain Type:  Ache

 Pain Location:  Abdomen

 Pain Goal:  3

   

===================================

Vaginal Exam

===================================

   

 Membrane Status:  Intact

   

===========================

Datetime: 03/10/2019 14:00

===========================

   

 Stage of Pregnancy:  Antepartum

   

===================================

Maternal Assessment

===================================

   

 Level of Consciousness:  Fully Conscious

 DTR's/Clonus:  DTRs 1+

 Headache:  Denies

 Breath Sounds, Left:  Clear and Equal

 Breath Sounds, Right:  Clear and Equal

 Nausea/Vomiting:  Denies

 RUQ Epigastric Pain:  Denies

   

===================================

Labor Evaluation

===================================

   

 Frequency:  OCC

 Monitor Mode:  External

 Duration (sec)2399:  40-60

 Quality:  Mild

 Pattern:  Normal: <= 5 Contractions in 10 Minutes

 Resting [place]:  Relaxed

   

===================================

Fetal Heart Rate

===================================

   

 FHR Baseline Rate:  145

 Monitor Mode:  External US

 Variability:  Moderate 6-25 bpm

 Accelerations:  15X15

 Decelerations:  Variable

 Category:  Category II

 Comments:  PT REPOSITIONED

      

   

===================================

Pain Assessment

===================================

   

 Pain Scale:  3

 Pain Presence:  Constant

 Pain Type:  Ache

 Pain Location:  Abdomen

 Pain Goal:  3

   

===================================

Vaginal Exam

===================================

   

 Membrane Status:  Intact

   

===========================

Datetime: 03/10/2019 13:55

===========================

   

 Comments:  variable noted, from baseline 135 down to 60 bpm for 30 sec and then going up to baseline
 , pt repositioned again to her  left side o2, IV increased

   

===========================

Datetime: 03/10/2019 13:01

===========================

   

 Stage of Pregnancy:  Antepartum

   

===========================

Datetime: 03/10/2019 13:00

===========================

   

 Stage of Pregnancy:  Antepartum

   

===================================

Maternal Assessment

===================================

   

 Level of Consciousness:  Fully Conscious

 DTR's/Clonus:  DTRs 1+

 Headache:  Denies

 Breath Sounds, Left:  Clear and Equal

 Breath Sounds, Right:  Clear and Equal

 Nausea/Vomiting:  Denies

 RUQ Epigastric Pain:  Denies

   

===================================

Labor Evaluation

===================================

   

 Frequency:  NONE

 Monitor Mode:  External

 [place]ing [place]:  Relaxed

 Fetal Interventions:  Side to Side

   

===================================

Fetal Heart Rate

===================================

   

 FHR Baseline Rate:  145

 Monitor Mode:  External US

 Variability:  Moderate 6-25 bpm

 Accelerations:  15X15

 Decelerations:  Variable

 Category:  Category II

   

===================================

Pain Assessment

===================================

   

 Pain Scale:  3

 Pain Presence:  Constant

 Pain Type:  Ache

 Pain Location:  Abdomen

 Pain Goal:  3

   

===================================

Vaginal Exam

===================================

   

 Membrane Status:  Intact

   

===========================

Datetime: 03/10/2019 12:52

===========================

   

 Stage of Pregnancy:  Antepartum

   

===========================

Datetime: 03/10/2019 12:00

===========================

   

 Stage of Pregnancy:  Antepartum

   

===================================

Maternal Assessment

===================================

   

 Level of Consciousness:  Fully Conscious

 DTR's/Clonus:  DTRs 1+

 Headache:  Denies

 Breath Sounds, Left:  Clear and Equal

 Breath Sounds, Right:  Clear and Equal

 Nausea/Vomiting:  Denies

 RUQ Epigastric Pain:  Denies

   

===================================

Labor Evaluation

===================================

   

 Frequency:  NONE

 Monitor Mode:  External

 [place]ing [place]:  Relaxed

 Contraction Comments:  PT COMPLAINING OF INCISION PAIN, ABDOMEN PALPETED SOFT AT THIS TIME

   

===================================

Fetal Heart Rate

===================================

   

 FHR Baseline Rate:  145

 Monitor Mode:  External US

 Variability:  Moderate 6-25 bpm

 Accelerations:  15X15

 Decelerations:  Variable

 Category:  Category II

   

===================================

Pain Assessment

===================================

   

 Pain Scale:  3

 Pain Presence:  Constant

 Pain Type:  Ache

 Pain Location:  Abdomen

 Pain Goal:  3

   

===================================

Vaginal Exam

===================================

   

 Membrane Status:  Intact

   

===========================

Datetime: 03/10/2019 11:21

===========================

   

 Stage of Pregnancy:  Antepartum

   

===========================

Datetime: 03/10/2019 11:18

===========================

   

 Stage of Pregnancy:  Antepartum

 Lower Extremities Edema:  None

     Degree:  None

 Upper Extremities Edema:  None

     Degree:  None

 Fall Score:  0

 Fall Risk Score Definition:  No Risk: No action required

   

===========================

Datetime: 03/10/2019 11:16

===========================

   

 Stage of Pregnancy:  Antepartum

   

===========================

Datetime: 03/10/2019 11:01

===========================

   

 Stage of Pregnancy:  Antepartum

   

===========================

Datetime: 03/10/2019 09:58

===========================

   

 Fall Score:  0

 Fall Risk Score Definition:  No Risk: No action required

   

===========================

Datetime: 03/10/2019 09:43

===========================

   

 EGA:  35.1

   

===========================

Datetime: 03/07/2019 10:45

===========================

   

 Fall Score:  0

 Fall Risk Score Definition:  No Risk: No action required

   

===========================

Datetime: 03/07/2019 10:44

===========================

   

 EGA:  34.5

   

===========================

Datetime: 03/05/2019 23:51

===========================

   

 EGA:  34.3

## 2019-03-29 NOTE — OPPN
Date/Time of Note


Date/Time of Note


DATE: 3/29/19 


TIME: 21:27





Operative Report


Planned Procedure


Procedure date


Mar 29, 2019


Procedure(s)


RLTC/S


lysis of adhesion


Performed by


see signature line


Assistant:  KAMRYN ESPINOZA MD


2nd Assistant


none


Anesthesiologist:  NOEMÍ JORDAN MD


Pre-procedure diagnosis


[id number] 


X2 previous c/s


in labor


                 [id number]
Anesthesia Type:  [id number]
spinal








Post-Procedure


Post-procedure diagnosis


delivered normal female infant


severe pelvic  omental adhesions


rt paratubal cyst


Findings


Live Baby [f], Apgars [8] and [9], weight [7lb 4oz], position [lot], vx] 


presentation no[]cord.


Estimated Blood Loss:  other


Specimen(s)


none


Grafts/Implant(s)


none


Complication(s)


none











GARCIA MELGAR MD                Mar 29, 2019 21:31

## 2019-03-29 NOTE — HP
Date/Time of Note


Date/Time of Note


DATE: 3/29/19 


TIME: 17:32





OB - History


Hx of Present Pregnancy


Free Text/Dictation


24y.0 [id number] had x2 c/s  present with pain on incisional site


received olamide during prenatal course  due  to  hx of PTB


EFM  shows uc's   which started  at 0900 today  which is regular


pain level is 8/10  which is progressively worse


prepared  to have repeat c/s .


Chief Complaint:  incisional pain and UC's


Estimated Due Date:  2019


:  3


Para:  2


Spontaneous :  0


Therapeutic :  0


Prenatal Care:  Good Care


Ultrasounds:  Normal mid trimester US


Obstetrical Complications:  None


Medical Complications:  None





Past Family/Social History


*


Past Medical, Surgical, Family and Obstetric Histories reviewed from prenatal ch


art.


Blood Type:  O+


Rubella:  immune


RPR/VDRL:  Negative


GBS Status:  Unknown


HBsAG:  Negative





OB  Admission Exam


Vital Signs


Vital Signs





Vital Signs


  Date      Temp  Pulse  Resp  B/P (MAP)   Pulse Ox  O2          O2 Flow    FiO2


Time                                                 Delivery    Rate


   3/29/19  97.7                   122/65


     11:26                           (84)








Physical Exam


HEENT:  WNL


Heart:  Rhythm Normal


Lungs:  Clear, Equal


Abdomen:  WNL


Extremities:  Normal


Reflexes:  Normal


Cervical Dilatation:  other


Effacement:  Other


Station:  Other


Membranes:  Intact


Amniotic Fluid:  Unevaluable


Fetal Heart Rate:  140's


Accelerations:  Accelerations Present


Decelerations:  No Decelerations


Varibility:  Moderate


Contractions on Admission:  < 5 Minutes Apart


Intensity:  Moderate


Last 72 hours Lab Results


                                    CBC & BMP


3/29/19 13:54











OB  Assessment/Plan


Reason for admission:  active labor


Other Assessment:


IU|P 37weeks


X2 Previous c/s


Plan:   Section











GARCIA MELGAR MD                Mar 29, 2019 17:44

## 2019-03-29 NOTE — PREAC
Date/Time of Note


Date/Time of Note


DATE: 3/29/19 


TIME: 17:51





Anesthesia Eval and Record


Evaluation


Time Pre-Procedure Interview


DATE: 3/29/19 


TIME: 17:51


Age


24


Sex


female


NPO:  8 hrs


Preoperative diagnosis


IUP


Planned procedure


L&D Epidural





Past Medical History


Past Medical History:  None





Surgery & Anesthesia Issues


No known issue





Meds


Anticoagulation:  No


Beta Blocker within 24 hr:  No


Reason Beta Blocker not given:  Pt. not on B-Blocker


Reported Medications


Prenatal Vit #76/Iron,Carb/FA (Prenatabs Rx Tablet) 1 Each Tablet, 1 EACH PO 


DAILY, TAB


   3/5/19





Current Medications


Lactated Ringer's 1,000 ml @  125 mls/hr Q8H IV  Last administered on 3/29/19at 


17:19; Admin Dose 125 MLS/HR;  Start 3/29/19 at 13:35


Cefazolin Sodium/ Dextrose 50 ml @  100 mls/hr ONCE IVPB ;  Start 3/29/19 at 


14:00


Oxytocin/Lactated Ringer's 500 ml @ 0 mls/hr ONCE PRN IV .VAGINAL BLEEDING;  


Start 3/29/19 at 14:00


Methylergonovine Maleate (Methergine) 0.2 mg ONCE PRN IM .VAGINAL BLEEDING;  


Start 3/29/19 at 14:00


Carboprost Tromethamine (Hemabate) 250 mcg ONCE PRN IM .VAGINAL BLEEDING;  Start


3/29/19 at 14:00


Misoprostol (Cytotec) 1,000 mcg ONCE PRN LA .VAGINAL BLEEDING;  Start 3/29/19 at


14:00


Azithromycin 250 ml @  250 mls/hr ONCE  ONCE IVPB  Last administered on 


3/29/19at 17:45; Admin Dose 250 MLS/HR;  Start 3/29/19 at 17:30;  Stop 3/29/19 


at 18:29


Meds reviewed:  Yes





Allergies


Coded Allergies:  


     No Known Allergy (Unverified , 3/10/19)


Allergies Reviewed:  Yes





Labs/Studies


Labs Reviewed:  Reviewed by anesthesiologist


Result Diagram:  


3/29/19 1354





Laboratory Tests


3/29/19 13:54











Blood Bank


                  Test
                         3/29/19
13:54


                  Blood Type                    O POSITIVE


                  Rh Immune Globulin Candidate  NO





Pregnancy test:  Positive





Pre-procedure Exam


Last vitals





Vital Signs


  Date      Temp  Pulse  Resp  B/P (MAP)   Pulse Ox  O2          O2 Flow    FiO2


Time                                                 Delivery    Rate


   3/29/19  97.7                   122/65


     11:26                           (84)





Airway:  Adequate mouth opening, Adequate thyromental dist


Mallampati:  Mallampati II


Teeth:  Normal


Lung:  Normal


Heart:  Normal





ASA Physical Status


ASA physical status:  2


Emergency:  None





Planned Anesthetic


Neuraxial:  Spinal, Epidural





Planned Pain Management


Sub-arachniod narcotics, Parenteral pain med





Pre-operative Attestations


Prior to commencing anesthesia and surgery, the patient was re-evaluated, there 


was verification of:


*The patient's identity


*The results of appropriate recent lab work and preoperative vital signs


*The above evaluation not changing prior to induction


*Anesthetic plan, risk benefits, alternative and complications discussed with 


patient/family; questions answered; patient/family understands, accepts and 


wishes to proceed.











NOEMÍ JORDAN MD              Mar 29, 2019 17:52

## 2019-03-29 NOTE — PAC
Date/Time of Note


Date/Time of Note


DATE: 3/29/19 


TIME: 21:24





Post-Anesthesia Notes


Post-Anesthesia Note


Last documented vital signs





Vital Signs


  Date      Temp  Pulse  Resp  B/P (MAP)   Pulse Ox  O2          O2 Flow    FiO2


Time                                                 Delivery    Rate


   3/29/19  97.7                   122/65


     11:26                           (84)





Activity:  WNL


Respiratory function:  WNL


Cardiovascular function:  WNL


Mental status:  Baseline


Pain reasonably controlled:  Yes


Hydration appropriate:  Yes


Nausea/Vomiting absent:  Yes


Comments


BP:112/56, P:78, Spo2:100%, T:98.9











NOEMÍ JORDAN MD              Mar 29, 2019 21:24

## 2019-03-30 VITALS — DIASTOLIC BLOOD PRESSURE: 56 MMHG | RESPIRATION RATE: 18 BRPM | SYSTOLIC BLOOD PRESSURE: 98 MMHG | HEART RATE: 100 BPM

## 2019-03-30 VITALS — SYSTOLIC BLOOD PRESSURE: 92 MMHG | HEART RATE: 102 BPM | DIASTOLIC BLOOD PRESSURE: 57 MMHG | RESPIRATION RATE: 18 BRPM

## 2019-03-30 VITALS — DIASTOLIC BLOOD PRESSURE: 60 MMHG | RESPIRATION RATE: 18 BRPM | SYSTOLIC BLOOD PRESSURE: 111 MMHG | HEART RATE: 101 BPM

## 2019-03-30 VITALS — SYSTOLIC BLOOD PRESSURE: 109 MMHG | RESPIRATION RATE: 18 BRPM | DIASTOLIC BLOOD PRESSURE: 55 MMHG | HEART RATE: 111 BPM

## 2019-03-30 VITALS — DIASTOLIC BLOOD PRESSURE: 50 MMHG | SYSTOLIC BLOOD PRESSURE: 92 MMHG | HEART RATE: 110 BPM | RESPIRATION RATE: 18 BRPM

## 2019-03-30 VITALS — SYSTOLIC BLOOD PRESSURE: 91 MMHG | HEART RATE: 103 BPM | DIASTOLIC BLOOD PRESSURE: 53 MMHG | RESPIRATION RATE: 16 BRPM

## 2019-03-30 VITALS — HEART RATE: 96 BPM | DIASTOLIC BLOOD PRESSURE: 56 MMHG | RESPIRATION RATE: 18 BRPM | SYSTOLIC BLOOD PRESSURE: 98 MMHG

## 2019-03-30 VITALS — DIASTOLIC BLOOD PRESSURE: 50 MMHG | HEART RATE: 96 BPM | SYSTOLIC BLOOD PRESSURE: 92 MMHG | RESPIRATION RATE: 18 BRPM

## 2019-03-30 VITALS — RESPIRATION RATE: 16 BRPM | DIASTOLIC BLOOD PRESSURE: 51 MMHG | HEART RATE: 86 BPM | SYSTOLIC BLOOD PRESSURE: 96 MMHG

## 2019-03-30 VITALS — DIASTOLIC BLOOD PRESSURE: 65 MMHG | HEART RATE: 102 BPM | SYSTOLIC BLOOD PRESSURE: 97 MMHG | RESPIRATION RATE: 16 BRPM

## 2019-03-30 LAB
ADD MAN DIFF?: NO
ALANINE AMINOTRANSFERASE: 18 IU/L (ref 13–69)
ALBUMIN/GLOBULIN RATIO: 1
ALBUMIN: 2.4 G/DL (ref 3.3–4.9)
ALKALINE PHOSPHATASE: 76 IU/L (ref 42–121)
ANION GAP: 5 (ref 5–13)
ASPARTATE AMINO TRANSFERASE: 27 IU/L (ref 15–46)
BASOPHIL #: 0 10^3/UL (ref 0–0.1)
BASOPHILS %: 0.2 % (ref 0–2)
BASOPHILS %: 0.2 % (ref 0–2)
BASOPHILS %: 0.3 % (ref 0–2)
BILIRUBIN,DIRECT: 0 MG/DL (ref 0–0.2)
BILIRUBIN,TOTAL: 0.3 MG/DL (ref 0.2–1.3)
BLOOD UREA NITROGEN: 8 MG/DL (ref 7–20)
CALCIUM: 8 MG/DL (ref 8.4–10.2)
CARBON DIOXIDE: 26 MMOL/L (ref 21–31)
CHLORIDE: 107 MMOL/L (ref 97–110)
CREATININE: 0.54 MG/DL (ref 0.44–1)
EOSINOPHILS #: 0 10^3/UL (ref 0–0.5)
EOSINOPHILS %: 0.1 % (ref 0–7)
EOSINOPHILS %: 0.1 % (ref 0–7)
EOSINOPHILS %: 0.2 % (ref 0–7)
GLOBULIN: 2.4 G/DL (ref 1.3–3.2)
GLUCOSE: 72 MG/DL (ref 70–220)
HEMATOCRIT: 24.7 % (ref 37–47)
HEMATOCRIT: 25 % (ref 37–47)
HEMATOCRIT: 26.5 % (ref 37–47)
HEMOGLOBIN: 7.9 G/DL (ref 12–16)
HEMOGLOBIN: 8 G/DL (ref 12–16)
HEMOGLOBIN: 8.5 G/DL (ref 12–16)
IMMATURE GRANS #M: 0.07 10^3/UL (ref 0–0.03)
IMMATURE GRANS #M: 0.07 10^3/UL (ref 0–0.03)
IMMATURE GRANS #M: 0.09 10^3/UL (ref 0–0.03)
IMMATURE GRANS % (M): 0.5 % (ref 0–0.43)
IMMATURE GRANS % (M): 0.6 % (ref 0–0.43)
IMMATURE GRANS % (M): 0.6 % (ref 0–0.43)
LYMPHOCYTES #: 1.4 10^3/UL (ref 0.8–2.9)
LYMPHOCYTES #: 1.7 10^3/UL (ref 0.8–2.9)
LYMPHOCYTES #: 1.8 10^3/UL (ref 0.8–2.9)
LYMPHOCYTES %: 10.2 % (ref 15–51)
LYMPHOCYTES %: 13.1 % (ref 15–51)
LYMPHOCYTES %: 15.6 % (ref 15–51)
MEAN CORPUSCULAR HEMOGLOBIN: 28.4 PG (ref 29–33)
MEAN CORPUSCULAR HEMOGLOBIN: 28.6 PG (ref 29–33)
MEAN CORPUSCULAR HEMOGLOBIN: 28.9 PG (ref 29–33)
MEAN CORPUSCULAR HGB CONC: 32 G/DL (ref 32–37)
MEAN CORPUSCULAR HGB CONC: 32 G/DL (ref 32–37)
MEAN CORPUSCULAR HGB CONC: 32.1 G/DL (ref 32–37)
MEAN CORPUSCULAR VOLUME: 88.6 FL (ref 82–101)
MEAN CORPUSCULAR VOLUME: 89.5 FL (ref 82–101)
MEAN CORPUSCULAR VOLUME: 90.3 FL (ref 82–101)
MEAN PLATELET VOLUME: 8.5 FL (ref 7.4–10.4)
MEAN PLATELET VOLUME: 8.9 FL (ref 7.4–10.4)
MEAN PLATELET VOLUME: 9.2 FL (ref 7.4–10.4)
MONOCYTE #: 0.8 10^3/UL (ref 0.3–0.9)
MONOCYTE #: 0.9 10^3/UL (ref 0.3–0.9)
MONOCYTE #: 0.9 10^3/UL (ref 0.3–0.9)
MONOCYTES %: 6.3 % (ref 0–11)
MONOCYTES %: 6.6 % (ref 0–11)
MONOCYTES %: 6.7 % (ref 0–11)
NEUTROPHIL #: 10.4 10^3/UL (ref 1.6–7.5)
NEUTROPHIL #: 11.7 10^3/UL (ref 1.6–7.5)
NEUTROPHIL #: 9 10^3/UL (ref 1.6–7.5)
NEUTROPHILS %: 76.6 % (ref 39–77)
NEUTROPHILS %: 79.5 % (ref 39–77)
NEUTROPHILS %: 82.6 % (ref 39–77)
NUCLEATED RED BLOOD CELLS #: 0 10^3/UL (ref 0–0)
NUCLEATED RED BLOOD CELLS%: 0 /100WBC (ref 0–0)
PLATELET COUNT: 156 10^3/UL (ref 140–415)
PLATELET COUNT: 176 10^3/UL (ref 140–415)
PLATELET COUNT: 185 10^3/UL (ref 140–415)
POTASSIUM: 4.2 MMOL/L (ref 3.5–5.1)
RED BLOOD COUNT: 2.76 10^6/UL (ref 4.2–5.4)
RED BLOOD COUNT: 2.77 10^6/UL (ref 4.2–5.4)
RED BLOOD COUNT: 2.99 10^6/UL (ref 4.2–5.4)
RED CELL DISTRIBUTION WIDTH: 14.4 % (ref 11.5–14.5)
RED CELL DISTRIBUTION WIDTH: 14.5 % (ref 11.5–14.5)
RED CELL DISTRIBUTION WIDTH: 14.6 % (ref 11.5–14.5)
SODIUM: 138 MMOL/L (ref 135–144)
TOTAL PROTEIN: 4.8 G/DL (ref 6.1–8.1)
WHITE BLOOD COUNT: 11.7 10^3/UL (ref 4.8–10.8)
WHITE BLOOD COUNT: 13.1 10^3/UL (ref 4.8–10.8)
WHITE BLOOD COUNT: 14.1 10^3/UL (ref 4.8–10.8)

## 2019-03-30 RX ADMIN — Medication SCH MLS/HR: at 07:01

## 2019-03-30 RX ADMIN — KETOROLAC TROMETHAMINE PRN MG: 30 INJECTION, SOLUTION INTRAMUSCULAR at 11:37

## 2019-03-30 RX ADMIN — MORPHINE SULFATE 1 MG: 2 INJECTION, SOLUTION INTRAMUSCULAR; INTRAVENOUS at 03:02

## 2019-03-30 RX ADMIN — PYRIDOXINE HYDROCHLORIDE SCH MLS/HR: 100 INJECTION, SOLUTION INTRAMUSCULAR; INTRAVENOUS at 11:40

## 2019-03-30 RX ADMIN — KETOROLAC TROMETHAMINE 1 MG: 30 INJECTION, SOLUTION INTRAMUSCULAR at 17:28

## 2019-03-30 RX ADMIN — DOCUSATE SODIUM AND SENNOSIDES SCH TAB: 8.6; 5 TABLET, FILM COATED ORAL at 21:19

## 2019-03-30 RX ADMIN — PYRIDOXINE HYDROCHLORIDE SCH MLS/HR: 100 INJECTION, SOLUTION INTRAMUSCULAR; INTRAVENOUS at 20:00

## 2019-03-30 RX ADMIN — PYRIDOXINE HYDROCHLORIDE 1 MLS/HR: 100 INJECTION, SOLUTION INTRAMUSCULAR; INTRAVENOUS at 11:40

## 2019-03-30 RX ADMIN — PYRIDOXINE HYDROCHLORIDE 1 MLS/HR: 100 INJECTION, SOLUTION INTRAMUSCULAR; INTRAVENOUS at 02:43

## 2019-03-30 RX ADMIN — KETOROLAC TROMETHAMINE PRN MG: 30 INJECTION, SOLUTION INTRAMUSCULAR at 17:28

## 2019-03-30 RX ADMIN — Medication 1 MLS/HR: at 07:01

## 2019-03-30 RX ADMIN — DOCUSATE SODIUM AND SENNOSIDES 1 TAB: 8.6; 5 TABLET, FILM COATED ORAL at 21:19

## 2019-03-30 RX ADMIN — PYRIDOXINE HYDROCHLORIDE 1 MLS/HR: 100 INJECTION, SOLUTION INTRAMUSCULAR; INTRAVENOUS at 20:00

## 2019-03-30 RX ADMIN — KETOROLAC TROMETHAMINE 1 MG: 30 INJECTION, SOLUTION INTRAMUSCULAR at 11:37

## 2019-03-30 NOTE — OPR
DATE OF OPERATION:  2019

 

 

PREOPERATIVE DIAGNOSIS:  Pregnancy at 37 weeks and 6 days with 2 previous  sections, in labor
.

 

POSTOPERATIVE DIAGNOSES:

1.  Delivered normal female infant,

2.  Severe pelvic adhesions.

3.  Multiple omental adhesions.

 

PROCEDURES:  Repeat low transverse  section and lysis of multiple adhesions.

 

ANESTHESIA:  Spinal.

 

ANESTHESIOLOGIST:  Dr. Stewart.

 

SURGEON:  Meesook Kim, MD

 

ASSISTANT:  Puma Maxwell MD

 

ESTIMATED BLOOD LOSS:  Approximately 800 mL.

 

DESCRIPTION OF PROCEDURE:  Under proper induction of spinal anesthesia, the patient was placed in the
 frog position.  Crawford catheter was introduced into the bladder under sterile condition and repositio
yesenia to supine.  Abdominal wall was prepped and draped in usual aseptic manner.  A transverse incision
 was made along the previous incisional scar.  Scar tissue was excised.  Incision was carried down th
rough the subcutaneous tissue to the anterior recti fascia, which was incised transversely in length 
of the incision.  The fascial flap was created by blunt and sharp dissection of tendinous attachment 
with the difficult time because of the fibrotic changes from the previous surgery.  After entering th
e peritoneal cavity, it was difficult to get into because the bladder peritoneum was pulled up by a s
evere adhesion and there were multiple omental adhesions noted upon entering the peritoneal cavity.  
The visceral peritoneum along with the filmy adhesion was digitally and blindly removed and able to r
each the low segment of the uterus, but on top of that, there were multiple omentum that was attached
 to the left aspect of the entire uterine serosa that was covered with a spreading omental adhesion n
oted, which was very friable and caused bleeding by gentle touching.  A transverse incision was made 
by avoiding all this omental adhesions layer by layer, reached the amniotic membrane, ruptured, revea
led clear amniotic fluid and normal female was delivered by using Kiwi suction briefly 1 time and the
 mouth and nose were cleaned and cord was delayed clamped and cut, [facility]  Cord blood was obtained.  The placenta was removed manually and the cavity 
was completely explored after uterus was exteriorized.  During the exteriorization of the uterus, the
re were multiple bleeders noted from the omentum, which was spread over the uterine serosa plus multi
ple small adhesions along the parietal peritoneum, which was  by severing and the pedicle wa
s ligated with 0 chromic catgut.  During this procedure, there is so much bleeding noted from the ome
ntal adhesion.  Uterine incision was closed with #1 chromic catgut in continuous interlocking manner 
and then second layer also using 0 chromic catgut in continuous manner, thus imbricating the first la
rick of closure.  There was a bleeder on the left side in the middle portion, which was separately elsie
sed with 0 chromic catgut in figure-of-eight manner in multiple spot.  After the uterine incision was
 closed and multiple bleeders from the omental adhesion was also closed individually, upon entering t
he abdominal cavity, it was very tight, so muscle was severed on the right rectus muscle near the ins
ertion.  The bleeding from that lesion and also muscle was toned on the left side, which also caused 
the continuous oozing and the muscle also had multiple spot, which has a lot of bleeders, which were 
separately controlled with a suture.  After uterus was relocated and after irrigation was done and pr
ior to closure of the abdominal wall, there were continuous multiple bleeding spots, which were contr
olled by ligature and using electrocauterization.  After a lengthy time to control the bleeder ____ e
nough to close the abdominal wall.  At this point, there was no any clear peritoneal lining visualize
d because this is all covered with omentum and to avoid another bleeding, muscle was closed over with
 0 chromic catgut in continuous manner.  The rectus muscle near the insertion site, which bled separa
tely controlled the bleeding by using 0 chromic catgut.  The fascia was closed with a #1 Vicryl in co
ntinuous manner in 2 segments.  Prior to closing the muscle layer, the piece of Surgicel was applied 
on the uterine incisional site and also covered with Interceed over the anterior uterine wall where t
he omental adhesion was noted and then muscle was closed with 0 chromic catgut in continuous manner. 
 The fascia was closed with #1 Vicryl in continuous manner in 2 segments after the Surgicel was place
d on top of the rectus muscle.  Subcutaneous tissue was irrigated with water.  This layer was approxi
mated with a 2-0 plain in continuous manner and the skin was closed with a 3-0 Monocryl in subcuticul
ar manner.  Steri-Strips were applied.  A pressure dressing was applied.  Estimated blood loss was ap
proximately 800 mL.  The patient withstood the procedure and was sent to the recovery room in stable 
condition.

 

 

Dictated By: MEESOOK KIM MD MK/LEEANN

DD:    2019 21:45:55

DT:    2019 01:33:28

Conf#: [id number]

DID#:  [id number]

## 2019-03-31 VITALS — HEART RATE: 104 BPM | SYSTOLIC BLOOD PRESSURE: 112 MMHG | DIASTOLIC BLOOD PRESSURE: 62 MMHG | RESPIRATION RATE: 18 BRPM

## 2019-03-31 VITALS — SYSTOLIC BLOOD PRESSURE: 104 MMHG | RESPIRATION RATE: 16 BRPM | DIASTOLIC BLOOD PRESSURE: 56 MMHG | HEART RATE: 95 BPM

## 2019-03-31 VITALS — SYSTOLIC BLOOD PRESSURE: 92 MMHG | HEART RATE: 104 BPM | DIASTOLIC BLOOD PRESSURE: 50 MMHG | RESPIRATION RATE: 17 BRPM

## 2019-03-31 VITALS — DIASTOLIC BLOOD PRESSURE: 60 MMHG | RESPIRATION RATE: 18 BRPM | HEART RATE: 97 BPM | SYSTOLIC BLOOD PRESSURE: 111 MMHG

## 2019-03-31 VITALS — RESPIRATION RATE: 20 BRPM | HEART RATE: 95 BPM | DIASTOLIC BLOOD PRESSURE: 56 MMHG | SYSTOLIC BLOOD PRESSURE: 104 MMHG

## 2019-03-31 RX ADMIN — IBUPROFEN 1 MG: 600 TABLET ORAL at 11:32

## 2019-03-31 RX ADMIN — PYRIDOXINE HYDROCHLORIDE SCH MLS/HR: 100 INJECTION, SOLUTION INTRAMUSCULAR; INTRAVENOUS at 04:00

## 2019-03-31 RX ADMIN — PYRIDOXINE HYDROCHLORIDE 1 MLS/HR: 100 INJECTION, SOLUTION INTRAMUSCULAR; INTRAVENOUS at 04:00

## 2019-03-31 RX ADMIN — IBUPROFEN 1 MG: 600 TABLET ORAL at 00:00

## 2019-03-31 RX ADMIN — IBUPROFEN SCH MG: 600 TABLET ORAL at 17:09

## 2019-03-31 RX ADMIN — DOCUSATE SODIUM AND SENNOSIDES SCH TAB: 8.6; 5 TABLET, FILM COATED ORAL at 21:32

## 2019-03-31 RX ADMIN — IBUPROFEN 1 MG: 600 TABLET ORAL at 05:22

## 2019-03-31 RX ADMIN — IBUPROFEN 1 MG: 600 TABLET ORAL at 17:09

## 2019-03-31 RX ADMIN — DOCUSATE SODIUM AND SENNOSIDES SCH TAB: 8.6; 5 TABLET, FILM COATED ORAL at 09:04

## 2019-03-31 RX ADMIN — IBUPROFEN SCH MG: 600 TABLET ORAL at 00:00

## 2019-03-31 RX ADMIN — DOCUSATE SODIUM AND SENNOSIDES 1 TAB: 8.6; 5 TABLET, FILM COATED ORAL at 09:04

## 2019-03-31 RX ADMIN — IBUPROFEN SCH MG: 600 TABLET ORAL at 05:22

## 2019-03-31 RX ADMIN — DOCUSATE SODIUM AND SENNOSIDES 1 TAB: 8.6; 5 TABLET, FILM COATED ORAL at 21:32

## 2019-03-31 RX ADMIN — IBUPROFEN SCH MG: 600 TABLET ORAL at 11:32

## 2019-03-31 NOTE — QN
Documentation


Comment


POD #2 s/p 


Pt feels well and is breastfeeding w/o a problem. Good pain control.


T=98.7  /62


Fundus firm and at the umbilicus.


Incision is clean, dry, and intact.


Ext 1+ edema, NT.


WBC 14.1  Hgb 8.5  Plts 176K


P: Continue care. Plan d/c tomorrow.











KAMRYN ESPINOZA MD             Mar 31, 2019 09:56

## 2019-03-31 NOTE — QN
Documentation


Comment


This is a late entry note for 3/30/2019





Postop day #1 


S/P R C/S





Patient stable and afebrile


Tolerating regular diet and ambulating and voiding without any difficulties


Vital signs stable








                          VS - Last 72 Hours, by Label


  Date      Temp  Pulse  Resp  B/P (MAP)   Pulse Ox  O2          O2 Flow    FiO2


Time                                                 Delivery    Rate


   3/31/19  98.0     97    18      111/60            Room Air


     16:21                           (77)


   3/31/19  98.3    104    17  92/50 (64)            Room Air


     07:50


   3/31/19  98.7    104    18      112/62            Room Air


     03:45                           (79)


   3/30/19  98.9    111    18      109/55        98  Room Air


     20:50                           (73)


   3/30/19  99.3    110    18  92/50 (64)        98  Room Air


     15:07


   3/30/19  99.1    100    18  98/56 (70)        97  Room Air


     12:00


   3/30/19  99.4    101    18      111/60        98  Room Air


     08:10                           (77)


   3/30/19           96    18  92/50 (64)        98  Room Air


     07:15


   3/30/19          103    16  91/53 (66)       100  Room Air


     07:00


   3/30/19          102    18  92/57 (69)       100  Room Air


     06:45


   3/30/19  98.2    102    16  97/65 (76)        99  Room Air


     06:31


   3/30/19           96    18  98/56 (70)       100  Room Air


     06:15


   3/30/19           86    16  96/51 (66)       100  Room Air


     05:59


   3/29/19  97.7                   122/65


     11:26                           (84)














                               Hematology - 72 Hrs


Test
              3/29/19
13:54   3/30/19
00:37   3/30/19
04:34   3/30/19
14:39


Hematocrit
                 34.9            25.0            24.7            26.5


                  %
(37.0-47.0)   %
(37.0-47.0)


                               L              #L               L               L


Hemoglobin
                 11.3             8.0             7.9             8.5


                 g/dl
(12.0-16.0  g/dl
(12.0-16.  g/dl
(12.0-16.  g/dl
(12.0-16.


                            )  L          0)  #L           0)  L           0)  L


Mean                        28.6            28.9            28.6            28.4


Corpuscular      pg
(29.0-33.0)   pg
(29.0-33.0)


Hemoglobin
                    L               L               L               L


Mean                        32.4            32.0            32.0            32.1


Corpuscular      g/dl
(32.0-37.0  g/dl
(32.0-37.  g/dl
(32.0-37.  g/dl
(32.0-37.


Hemoglobin
Conc                )              0)              0)              0)


ent


Mean                        88.4            90.3            89.5            88.6


Corpuscular      fl
(82.0-101.0)  fl
(82.0-101.0  fl
(82.0-101.0  fl
(82.0-101.0


Volume
                                        )               )               )


Mean Platelet                9.5             9.2             8.5             8.9


Volume
            fl
(7.4-10.4)   fl
(7.4-10.4)


Platelet Count
              224             185             156             176


                 10^3/UL
(140-41  10^3/UL
(140-4  10^3/UL
(140-4  10^3/UL
(140-4


                              5)             15)             15)             15)


Red Blood                   3.95            2.77            2.76            2.99


Count
           10^6/ul
(4.20-5  10^6/ul
(4.20-  10^6/ul
(4.20-  10^6/ul
(4.20-


                         .40)  L       5.40)  #L        5.40)  L        5.40)  L


Red Cell                    14.6            14.5            14.4            14.6


Distribution      %
(11.5-14.5)    %
(11.5-14.5)


Width
                         H                                               H


White Blood                 10.4            13.1            11.7            14.1


Count
           10^3/ul
(4.8-10  10^3/ul
(4.8-1  10^3/ul
(4.8-1  10^3/ul
(4.8-1


                          .8)  #        0.8)  #H         0.8)  H        0.8)  #H





Chemistry


          Test
                                          3/30/19
04:34


          Sodium Level
                           138 mmol/L
(135-144)


          Potassium Level
                        4.2 mmol/L
(3.5-5.1)


          Chloride Level
                          107 mmol/L
()


          Carbon Dioxide Level
                      26 mmol/L
(21-31)


          Anion Gap                                         5 (5-13)


          Blood Urea Nitrogen
                         8 mg/dl (7-20)



          Creatinine
                           0.54 mg/dl
(0.44-1.00)


          Est Glomerular Filtrat Rate
mL/min   > 60 mL/min
(>60)


          Glucose Level
                             72 mg/dl
()


          Calcium Level
                       8.0 mg/dl
(8.4-10.2)  L


          Total Bilirubin
                         0.3 mg/dl
(0.2-1.3)


          Direct Bilirubin
                     0.00 mg/dl
(0.00-0.20)


          Indirect Bilirubin
                        0.3 mg/dl
(0-1.1)


          Aspartate Amino Transf
(AST/SGOT)            27 IU/L
(15-46)


          Alanine Aminotransferase
(ALT/SGPT)          18 IU/L
(13-69)


          Alkaline Phosphatase
                       76 IU/L
()


          Total Protein
                         4.8 g/dl
(6.1-8.1)  L


          Albumin
                               2.4 g/dl
(3.3-4.9)  L


          Globulin
                                2.40 g/dl
(1.3-3.2)


          Albumin/Globulin Ratio                                1.00








Abdomen soft, fundus firm


Incision C/D/I


Extremities nontender





Assessment and plan;


Patient stable and doing well


Encouraged to ambulate


Continue with routine postop care











CATIE BOWEN MD             Mar 31, 2019 20:06

## 2019-04-01 VITALS — DIASTOLIC BLOOD PRESSURE: 76 MMHG | RESPIRATION RATE: 16 BRPM | HEART RATE: 90 BPM | SYSTOLIC BLOOD PRESSURE: 118 MMHG

## 2019-04-01 VITALS — SYSTOLIC BLOOD PRESSURE: 121 MMHG | RESPIRATION RATE: 16 BRPM | DIASTOLIC BLOOD PRESSURE: 75 MMHG | HEART RATE: 80 BPM

## 2019-04-01 VITALS — HEART RATE: 86 BPM | SYSTOLIC BLOOD PRESSURE: 112 MMHG | DIASTOLIC BLOOD PRESSURE: 77 MMHG | RESPIRATION RATE: 16 BRPM

## 2019-04-01 LAB
ADD MAN DIFF?: NO
BASOPHIL #: 0 10^3/UL (ref 0–0.1)
BASOPHILS %: 0.2 % (ref 0–2)
EOSINOPHILS #: 0.3 10^3/UL (ref 0–0.5)
EOSINOPHILS %: 2.2 % (ref 0–7)
HEMATOCRIT: 23.7 % (ref 37–47)
HEMATOCRIT: 25.5 % (ref 37–47)
HEMOGLOBIN: 7.5 G/DL (ref 12–16)
HEMOGLOBIN: 8.1 G/DL (ref 12–16)
IMMATURE GRANS #M: 0.06 10^3/UL (ref 0–0.03)
IMMATURE GRANS % (M): 0.5 % (ref 0–0.43)
LYMPHOCYTES #: 1.9 10^3/UL (ref 0.8–2.9)
LYMPHOCYTES %: 15.1 % (ref 15–51)
MEAN CORPUSCULAR HEMOGLOBIN: 28.4 PG (ref 29–33)
MEAN CORPUSCULAR HGB CONC: 31.6 G/DL (ref 32–37)
MEAN CORPUSCULAR VOLUME: 89.8 FL (ref 82–101)
MEAN PLATELET VOLUME: 9.3 FL (ref 7.4–10.4)
MONOCYTE #: 0.7 10^3/UL (ref 0.3–0.9)
MONOCYTES %: 5.8 % (ref 0–11)
NEUTROPHIL #: 9.4 10^3/UL (ref 1.6–7.5)
NEUTROPHILS %: 76.2 % (ref 39–77)
NUCLEATED RED BLOOD CELLS #: 0 10^3/UL (ref 0–0)
NUCLEATED RED BLOOD CELLS%: 0 /100WBC (ref 0–0)
PLATELET COUNT: 214 10^3/UL (ref 140–415)
RED BLOOD COUNT: 2.64 10^6/UL (ref 4.2–5.4)
RED CELL DISTRIBUTION WIDTH: 14.9 % (ref 11.5–14.5)
WHITE BLOOD COUNT: 12.3 10^3/UL (ref 4.8–10.8)

## 2019-04-01 RX ADMIN — IBUPROFEN 1 MG: 600 TABLET ORAL at 05:52

## 2019-04-01 RX ADMIN — IBUPROFEN 1 MG: 600 TABLET ORAL at 18:06

## 2019-04-01 RX ADMIN — IBUPROFEN SCH MG: 600 TABLET ORAL at 05:52

## 2019-04-01 RX ADMIN — IBUPROFEN 1 MG: 600 TABLET ORAL at 12:20

## 2019-04-01 RX ADMIN — IBUPROFEN SCH MG: 600 TABLET ORAL at 18:06

## 2019-04-01 RX ADMIN — DOCUSATE SODIUM AND SENNOSIDES 1 TAB: 8.6; 5 TABLET, FILM COATED ORAL at 09:08

## 2019-04-01 RX ADMIN — IBUPROFEN 1 MG: 600 TABLET ORAL at 02:02

## 2019-04-01 RX ADMIN — DOCUSATE SODIUM AND SENNOSIDES SCH TAB: 8.6; 5 TABLET, FILM COATED ORAL at 09:08

## 2019-04-01 RX ADMIN — IBUPROFEN SCH MG: 600 TABLET ORAL at 12:20

## 2019-04-01 RX ADMIN — FERROUS GLUCONATE 1 MG: 324 TABLET ORAL at 12:53

## 2019-04-01 RX ADMIN — IBUPROFEN SCH MG: 600 TABLET ORAL at 02:02

## 2019-04-01 NOTE — QN
Documentation


Comment


progress note pod 3


patient seen and evaluated


no compliants


vs stable afebrile 


ab c/d/i no distention nt


extremity no edema no calf tenderness





a/ sp r cd with btl pod 3


stable afebrile





p iron supplement 


repeat h/h today before discharge today











STEPHANIE CRAMER MD            Apr 1, 2019 11:23

## 2019-04-01 NOTE — PD.PPDC
OB/GYN Discharge Instruction


Condition


                 [id number]
Patient Condition:  Mezlx9l
Fair








Diet


                Kdtfs2Yw
Diet:  Koukh8d
Resume Regular Diet








Activity/Restrictions


              Wqauk0Kc
Activity:      Abhpt3f
Normal Activity


                                        May Shower


              Yhmyh9Qs
Restrictions:  Idmpd3b
No Exercising


                                        No Lifting


                                        No Driving


                                        No Sexual Activity


                                        Nothing in the Vagina


                                        No [place]


                                        No Tampons, douche








Follow-up


Follow-up with Physician:  2, Week/Weeks





Return to clinic for


      Lzxde1Wg
GYN Instructions:       Yfkla1g
Fever greater than 101


                                         Chills


                                         Worsening abdominal pain


                                         Excessive Vaginal Bleeding


                                         More than 2 pads per hour


                                         Unable to tolerate diet


      Gtvui8Nh
OB Instructions:        Sccnk2h
Breast Tenderness


                                         Depression


                                         Blurried Vision


                                         Headache


      Rxdam7Vz
Surgical Instructions:  Lfzam9d
Incisional Drainage


                                         Incisional Redness














STEPHANIE CRAMER MD            Apr 1, 2019 11:24

## 2019-04-01 NOTE — DS
DATE OF ADMISSION: 2019

DATE OF DISCHARGE: 2019

 

PRIMARY DIAGNOSES:

1.  Intrauterine pregnancy at 37 weeks and 6 days' gestational age.

2.  Two previous  sections.

3.  In labor.

 

PROCEDURE:  Repeat low transverse  delivery with lysis of multiple adhesions.

 

CONDITION ON DISCHARGE:  Stable.

 

ACTIVITY:  None per vagina, no lifting x6 weeks.

 

DIET:  Regular.

 

MEDICATIONS ON DISCHARGE:

1.  Motrin.

2.  Iron.

 

DISCHARGE SUMMARY:  Ms. Renay Montgomery underwent a repeat  delivery with lysis of adhesions on
 2019.  She had uneventful postop day 1, 2 and 3.  Her incision is clean, dry and intact.  She 
is ambulating, tolerating diet, positive bowel movement before discharge.  She will have a repeat H a
nd H to assure H and H is stable.  She will follow up in the clinic in 2 weeks for postpartum/postop 
care.

 

 

Dictated By: STEPHANIE HOWARD/LEEANN

DD:    2019 11:27:38

DT:    2019 21:07:03

Conf#: [id number]

DID#:  [id number]

## 2019-04-02 NOTE — DELSUM
===================================

Delivery Summary A-C

===================================

Datetime Report Generated by CPN: 2019 21:33

   

   

===================================

DELIVERY PERSONNEL

===================================

   

Circulator:  Dries, Cecil

   

===================================

MATERNAL INFORMATION

===================================

   

Delivery Anesthesia:  Spinal

Medications in Delivery:  LR WITH 30 UNITS PITOCIN

Delivery QBL (ml):  800

Placenta Cultured:  No

Maternal Complications:  None

   

===================================

LABOR SUMMARY

===================================

   

EDC:  2019 00:00

No. Babies in Womb:  1

 Attempted:  No

Labor Anesthesia:  Intrathecal

   

===================================

LABOR INFORMATION

===================================

   

Reason for Induction:  Not Applicable

Oxytocin:  N/A

Group B Beta Strep:  Negative

Antibiotics # of Doses:  2

Antibiotics Time of Last Dose:  2019 19:05

Steroids Given:  Full Course

Reason Steroids Not Administered:  Not Applicable

   

===================================

MEMBRANES

===================================

   

Membranes Rupture Method:  Artificial

Rupture of Membranes:  2019 19:36

Length of Rupture (hr):  0.03

Amniotic Fluid Color:  Clear

Amniotic Fluid Amount:  Large

Amniotic Fluid Odor:  None

   

===================================

STAGES OF LABOR

===================================

   

Stage 3 hr:  0

Stage 3 min:  1

   

===================================

CSECTION DELIVERY

===================================

   

Primary Indication:  Repeat Elective

Secondary Indication:  Repeat Elective

CSection Urgency:  Elective

CSection Incidence:  Repeat

Labor:  Labor

Elective:  Elective

CSection Incision:  Lower Uterine Transverse

   

===================================

BABY A INFORMATION

===================================

   

Infant Delivery Date/Time:  2019 19:38

Method of Delivery:  

Born in Route :  No

:  N/A

Forceps:  N/A

Vacuum Extraction:  Successful

Shoulder Dystocia :  N/A

   

===================================

ASSISTED DELIVERY BABY A

===================================

   

Vacuum Number of Pulls:  1

Vacuum Number of PopOffs:  0

Vacuum :  KIWI

Total Time Vacuum Applied:  30 SECONDS

Vacuum/Forceps Comment:  PRESSURE CONTROLLED BY MD.

   

===================================

SHOULDER DYSTOCIA BABY A

===================================

   

Infant Delivery Date/Time:  2019 19:38

   

===================================

PRESENTATION/POSITION BABY A

===================================

   

Presentation:  Cephalic

Cephalic Presentation:  Vertex

Vertex Position:  Left Occipital Anterior

Breech Presentation:  N/A

   

===================================

PLACENTA INFORMATION BABY A

===================================

   

Placenta Delivery Time :  2019 19:39

Placenta Method of Delivery:  Manual Removal

Placenta Status:  Delivered

   

===================================

APGAR SCORES BABY A

===================================

   

Heart Rate 1 min:  >100 bpm

Resp Effort 1 min:  Good Cry

Reflex Irritability 1 min:  Cough/Sneeze/Pulls Away

Muscle Tone 1 min:  Active Motion

Color 1 min:  Blue/Pale

Resuscitation Effort 1 min:  Tactile Stimulation; PPV/NCPAP

APGAR SCORE 1 MIN:  8

Heart Rate 5 min:  >100 bpm

Resp Effort 5 min:  Good Cry

Reflex Irritability 5 min:  Cough/Sneeze/Pulls Away

Muscle Tone 5 min:  Active Motion

Color 5 min:  Body Pink, Extremit Blue

Resuscitation Effort 5 min:  Tactile Stimulation

APGAR SCORE 5 MIN:  9

   

===================================

INFANT INFORMATION BABY A

===================================

   

Gestational Age at Delivery:  37.6

Gestational Status:  Early Term- 37- 38.6 Weeks

Infant Outcome :  Liveborn

Infant Condition :  Stable

Infant Sex:  Female

   

===================================

IDENTIFICATION/MEDS BABY A

===================================

   

ID Band Number:  [id number]

ID Band Location:  Right Leg; Left Arm



Sensor Applied:  Yes

Sensor Number:  [id number]

Sensor Location :  Cord Clamp

Vitamin K Given :  Not Given

Erythromycin Given:  Not Given

   

===================================

WEIGHT/LENGTH BABY A

===================================

   

Infant Birthweight (gm):  3275

Infant Weight (lb):  7

Infant Weight (oz):  4

Infant Length (in):  19.00

Infant Length (cm):  48.26

   

===================================

CORD INFORMATION BABY A

===================================

   

No. Cord Vessels:  3

Nuchal Cord :  N/A

Cord Blood Taken:  Yes

Infant Suction:  Mouth; Nose; Pharynx

   

===================================

ASSESSMENT BABY A

===================================

   

Infant Complications:  None

Physical Findings at Delivery:  Within Normal Limits

Infant Respirations:  Appears Normal

Neonatologist/ALS Called :  No

Infant Care By:  GANESH SALMON RN

Transferred To:  Remains with Mother